# Patient Record
Sex: MALE | Race: WHITE | Employment: PART TIME | URBAN - METROPOLITAN AREA
[De-identification: names, ages, dates, MRNs, and addresses within clinical notes are randomized per-mention and may not be internally consistent; named-entity substitution may affect disease eponyms.]

---

## 2019-07-09 ENCOUNTER — APPOINTMENT (OUTPATIENT)
Dept: GENERAL RADIOLOGY | Age: 49
End: 2019-07-09
Payer: MEDICAID

## 2019-07-09 ENCOUNTER — HOSPITAL ENCOUNTER (EMERGENCY)
Age: 49
Discharge: HOME | End: 2019-07-09
Attending: EMERGENCY MEDICINE
Payer: MEDICAID

## 2019-07-09 VITALS
HEIGHT: 77 IN | TEMPERATURE: 98.1 F | BODY MASS INDEX: 37.19 KG/M2 | WEIGHT: 315 LBS | OXYGEN SATURATION: 91 % | HEART RATE: 71 BPM | SYSTOLIC BLOOD PRESSURE: 109 MMHG | DIASTOLIC BLOOD PRESSURE: 66 MMHG | RESPIRATION RATE: 16 BRPM

## 2019-07-09 DIAGNOSIS — I82.811 CHRONIC SUPERFICIAL VENOUS THROMBOSIS OF RIGHT LOWER EXTREMITY: Primary | ICD-10-CM

## 2019-07-09 PROCEDURE — 6370000000 HC RX 637 (ALT 250 FOR IP): Performed by: EMERGENCY MEDICINE

## 2019-07-09 PROCEDURE — 93971 EXTREMITY STUDY: CPT

## 2019-07-09 PROCEDURE — 73560 X-RAY EXAM OF KNEE 1 OR 2: CPT

## 2019-07-09 PROCEDURE — 99283 EMERGENCY DEPT VISIT LOW MDM: CPT

## 2019-07-09 RX ORDER — OMEPRAZOLE 20 MG/1
20 CAPSULE, DELAYED RELEASE ORAL
COMMUNITY
End: 2021-08-10 | Stop reason: SDUPTHER

## 2019-07-09 RX ORDER — NAPROXEN 500 MG/1
500 TABLET ORAL 2 TIMES DAILY PRN
Qty: 60 TABLET | Refills: 0 | Status: SHIPPED | OUTPATIENT
Start: 2019-07-09 | End: 2021-08-10

## 2019-07-09 RX ORDER — IBUPROFEN 600 MG/1
600 TABLET ORAL ONCE
Status: COMPLETED | OUTPATIENT
Start: 2019-07-09 | End: 2019-07-09

## 2019-07-09 RX ORDER — ALBUTEROL SULFATE 90 UG/1
1 AEROSOL, METERED RESPIRATORY (INHALATION)
COMMUNITY
Start: 2019-04-16 | End: 2021-08-10 | Stop reason: SDUPTHER

## 2019-07-09 RX ORDER — OXYCODONE HYDROCHLORIDE AND ACETAMINOPHEN 5; 325 MG/1; MG/1
1 TABLET ORAL EVERY 6 HOURS PRN
Qty: 16 TABLET | Refills: 0 | Status: SHIPPED | OUTPATIENT
Start: 2019-07-09 | End: 2019-07-12

## 2019-07-09 RX ORDER — AMLODIPINE BESYLATE 10 MG/1
10 TABLET ORAL
COMMUNITY
Start: 2019-04-16 | End: 2021-08-10 | Stop reason: SDUPTHER

## 2019-07-09 RX ORDER — SERTRALINE HYDROCHLORIDE 25 MG/1
TABLET, FILM COATED ORAL
Refills: 1 | COMMUNITY
Start: 2019-06-07 | End: 2021-08-10

## 2019-07-09 RX ADMIN — IBUPROFEN 600 MG: 600 TABLET, FILM COATED ORAL at 15:30

## 2019-07-09 SDOH — HEALTH STABILITY: MENTAL HEALTH: HOW OFTEN DO YOU HAVE A DRINK CONTAINING ALCOHOL?: NEVER

## 2019-07-09 ASSESSMENT — PAIN SCALES - GENERAL
PAINLEVEL_OUTOF10: 8
PAINLEVEL_OUTOF10: 8

## 2019-07-09 ASSESSMENT — PAIN DESCRIPTION - PAIN TYPE: TYPE: ACUTE PAIN

## 2019-07-09 ASSESSMENT — PAIN DESCRIPTION - ORIENTATION: ORIENTATION: RIGHT

## 2019-07-09 ASSESSMENT — PAIN DESCRIPTION - LOCATION: LOCATION: KNEE

## 2021-08-10 ENCOUNTER — OFFICE VISIT (OUTPATIENT)
Dept: FAMILY MEDICINE CLINIC | Age: 51
End: 2021-08-10
Payer: MEDICAID

## 2021-08-10 VITALS
OXYGEN SATURATION: 95 % | BODY MASS INDEX: 37.19 KG/M2 | WEIGHT: 315 LBS | HEIGHT: 77 IN | SYSTOLIC BLOOD PRESSURE: 142 MMHG | DIASTOLIC BLOOD PRESSURE: 92 MMHG | HEART RATE: 99 BPM

## 2021-08-10 DIAGNOSIS — Z86.718 HX OF BLOOD CLOTS: ICD-10-CM

## 2021-08-10 DIAGNOSIS — E66.01 CLASS 3 SEVERE OBESITY DUE TO EXCESS CALORIES WITHOUT SERIOUS COMORBIDITY WITH BODY MASS INDEX (BMI) OF 50.0 TO 59.9 IN ADULT (HCC): ICD-10-CM

## 2021-08-10 DIAGNOSIS — K21.9 GASTROESOPHAGEAL REFLUX DISEASE WITHOUT ESOPHAGITIS: ICD-10-CM

## 2021-08-10 DIAGNOSIS — J45.909 MODERATE ASTHMA, UNSPECIFIED WHETHER COMPLICATED, UNSPECIFIED WHETHER PERSISTENT: ICD-10-CM

## 2021-08-10 DIAGNOSIS — F32.A DEPRESSION, UNSPECIFIED DEPRESSION TYPE: ICD-10-CM

## 2021-08-10 DIAGNOSIS — Z00.00 ANNUAL PHYSICAL EXAM: Primary | ICD-10-CM

## 2021-08-10 DIAGNOSIS — I10 ESSENTIAL HYPERTENSION: ICD-10-CM

## 2021-08-10 PROCEDURE — 1036F TOBACCO NON-USER: CPT | Performed by: NURSE PRACTITIONER

## 2021-08-10 PROCEDURE — G8417 CALC BMI ABV UP PARAM F/U: HCPCS | Performed by: NURSE PRACTITIONER

## 2021-08-10 PROCEDURE — 99386 PREV VISIT NEW AGE 40-64: CPT | Performed by: NURSE PRACTITIONER

## 2021-08-10 PROCEDURE — G8427 DOCREV CUR MEDS BY ELIG CLIN: HCPCS | Performed by: NURSE PRACTITIONER

## 2021-08-10 PROCEDURE — 99204 OFFICE O/P NEW MOD 45 MIN: CPT | Performed by: NURSE PRACTITIONER

## 2021-08-10 RX ORDER — AMLODIPINE BESYLATE 10 MG/1
10 TABLET ORAL DAILY
Qty: 90 TABLET | Refills: 1 | Status: SHIPPED | OUTPATIENT
Start: 2021-08-10 | End: 2022-05-16 | Stop reason: SDUPTHER

## 2021-08-10 RX ORDER — OMEPRAZOLE 20 MG/1
20 CAPSULE, DELAYED RELEASE ORAL DAILY
Qty: 90 CAPSULE | Refills: 1 | Status: SHIPPED | OUTPATIENT
Start: 2021-08-10 | End: 2022-02-11

## 2021-08-10 RX ORDER — LISINOPRIL 20 MG/1
20 TABLET ORAL 2 TIMES DAILY
Qty: 90 TABLET | Refills: 1 | Status: SHIPPED | OUTPATIENT
Start: 2021-08-10 | End: 2021-08-12 | Stop reason: SDUPTHER

## 2021-08-10 RX ORDER — ALBUTEROL SULFATE 90 UG/1
1 AEROSOL, METERED RESPIRATORY (INHALATION) EVERY 4 HOURS PRN
Qty: 1 INHALER | Refills: 3 | Status: SHIPPED | OUTPATIENT
Start: 2021-08-10 | End: 2021-08-11 | Stop reason: SDUPTHER

## 2021-08-10 SDOH — ECONOMIC STABILITY: FOOD INSECURITY: WITHIN THE PAST 12 MONTHS, THE FOOD YOU BOUGHT JUST DIDN'T LAST AND YOU DIDN'T HAVE MONEY TO GET MORE.: NEVER TRUE

## 2021-08-10 SDOH — ECONOMIC STABILITY: FOOD INSECURITY: WITHIN THE PAST 12 MONTHS, YOU WORRIED THAT YOUR FOOD WOULD RUN OUT BEFORE YOU GOT MONEY TO BUY MORE.: NEVER TRUE

## 2021-08-10 ASSESSMENT — ENCOUNTER SYMPTOMS
EYES NEGATIVE: 1
SHORTNESS OF BREATH: 0
BLOOD IN STOOL: 0
CHEST TIGHTNESS: 0
CONSTIPATION: 0
ABDOMINAL PAIN: 0
DIARRHEA: 0
VOMITING: 0
WHEEZING: 0
COUGH: 0
NAUSEA: 0

## 2021-08-10 ASSESSMENT — PATIENT HEALTH QUESTIONNAIRE - PHQ9
SUM OF ALL RESPONSES TO PHQ QUESTIONS 1-9: 4
SUM OF ALL RESPONSES TO PHQ9 QUESTIONS 1 & 2: 4
SUM OF ALL RESPONSES TO PHQ QUESTIONS 1-9: 4
2. FEELING DOWN, DEPRESSED OR HOPELESS: 2
SUM OF ALL RESPONSES TO PHQ QUESTIONS 1-9: 4
1. LITTLE INTEREST OR PLEASURE IN DOING THINGS: 2

## 2021-08-10 ASSESSMENT — SOCIAL DETERMINANTS OF HEALTH (SDOH): HOW HARD IS IT FOR YOU TO PAY FOR THE VERY BASICS LIKE FOOD, HOUSING, MEDICAL CARE, AND HEATING?: NOT HARD AT ALL

## 2021-08-10 NOTE — PATIENT INSTRUCTIONS
[facility] [employer]   [address] [facility] [address]    Patient Education        Asthma in Adults: Care Instructions  Overview     Asthma makes it hard for you to breathe. During an asthma attack, the airways swell and narrow. Severe asthma attacks can be dangerous, but you can usually prevent them. Controlling asthma and treating symptoms before they get bad can help you avoid bad attacks. You may also avoid future trips to the doctor. Follow-up care is a key part of your treatment and safety. Be sure to make and go to all appointments, and call your doctor if you are having problems. It's also a good idea to know your test results and keep a list of the medicines you take. How can you care for yourself at home? · Follow your asthma action plan so you can manage your symptoms at home. An asthma action plan will help you prevent and control airway reactions and will tell you what to do during an asthma attack. If you do not have an asthma action plan, work with your doctor to build one. · Take your asthma medicine exactly as prescribed. Medicine plays an important role in controlling asthma. Talk to your doctor right away if you have any questions about what to take and how to take it. ? Use your quick-relief medicine when you have symptoms of an attack. Quick-relief medicine often is an albuterol inhaler. Some people need to use quick-relief medicine before they exercise. ? Take your controller medicine every day, not just when you have symptoms. Controller medicine is usually an inhaled corticosteroid. The goal is to prevent problems before they occur. ? If your doctor prescribed corticosteroid pills to use during an attack, take them as directed. They may take hours to work, but they may shorten the attack and help you breathe better. ? Keep your quick-relief medicine with you at all times. · Talk to your doctor before using other medicines.  Some medicines, such as aspirin, can cause asthma attacks in some people. · Check yourself for asthma symptoms to know which step to follow in your action plan. Watch for things like being short of breath, having chest tightness, coughing, and wheezing. Also notice if symptoms wake you up at night or if you get tired quickly when you exercise. · If you have a peak flow meter, use it to check how well you are breathing. This can help you predict when an asthma attack is going to occur. Then you can take medicine to prevent the asthma attack or make it less severe. · See your doctor regularly. These visits will help you learn more about asthma and what you can do to control it. Your doctor will monitor your treatment to make sure the medicine is helping you. · Keep track of your asthma attacks and your treatment. After you have had an attack, write down what triggered it, what helped end it, and any concerns you have about your asthma action plan. Take your diary when you see your doctor. You can then review your asthma action plan and decide if it is working. · Do not smoke or allow others to smoke around you. Avoid smoky places. Smoking makes asthma worse. If you need help quitting, talk to your doctor about stop-smoking programs and medicines. These can increase your chances of quitting for good. · Learn what triggers an asthma attack for you, and avoid the triggers when you can. Common triggers include colds, smoke, air pollution, dust, pollen, mold, pets, cockroaches, stress, and cold air. · Avoid colds and the flu. Talk to your doctor about getting a pneumococcal vaccine shot. If you have had one before, ask your doctor whether you need a second dose. Get a flu vaccine every fall. If you must be around people with colds or the flu, wash your hands often. When should you call for help? Call 911 anytime you think you may need emergency care. For example, call if:    · You have severe trouble breathing.    Call your doctor now or seek immediate medical care if:    · Your symptoms do not get better after you have followed your asthma action plan.     · You cough up yellow, dark brown, or bloody mucus (sputum). Watch closely for changes in your health, and be sure to contact your doctor if:    · Your coughing and wheezing get worse.     · You need to use quick-relief medicine on more than 2 days a week within a month (unless it is just for exercise).     · You need help figuring out what is triggering your asthma attacks. Where can you learn more? Go to https://chosmaneb.[employer]. org and sign in to your St[employer]wize account. Enter P597 in the [employer] box to learn more about \"Asthma in Adults: Care Instructions. \"     If you do not have an account, please click on the \"Sign Up Now\" link. Current as of: October 26, 2020               Content Version: 12.9  © 2006-2021 [employer]. Care instructions adapted under license by [facility] If you have questions about a medical condition or this instruction, always ask your healthcare professional. [place] [address] any warranty or liability for your use of this information. Patient Education        Well Visit, Ages 25 to 48: Care Instructions  Overview     Well visits can help you stay healthy. Your doctor has checked your overall health and may have suggested ways to take good care of yourself. Your doctor also may have recommended tests. At home, you can help prevent illness with healthy eating, regular exercise, and other steps. Follow-up care is a key part of your treatment and safety. Be sure to make and go to all appointments, and call your doctor if you are having problems. It's also a good idea to know your test results and keep a list of the medicines you take. How can you care for yourself at home? · Get screening tests that you and your doctor decide on.  Screening helps find diseases before any symptoms appear. · Eat healthy foods. Choose fruits, vegetables, whole grains, protein, and low-fat dairy foods. Limit fat, especially saturated fat. Reduce salt in your diet. · Limit alcohol. If you are a man, have no more than 2 drinks a day or 14 drinks a week. If you are a woman, have no more than 1 drink a day or 7 drinks a week. · Get at least 30 minutes of physical activity on most days of the week. Walking is a good choice. You also may want to do other activities, such as running, swimming, cycling, or playing tennis or team sports. Discuss any changes in your exercise program with your doctor. · Reach and stay at a healthy weight. This will lower your risk for many problems, such as obesity, diabetes, heart disease, and high blood pressure. · Do not smoke or allow others to smoke around you. If you need help quitting, talk to your doctor about stop-smoking programs and medicines. These can increase your chances of quitting for good. · Care for your mental health. It is easy to get weighed down by worry and stress. Learn strategies to manage stress, like deep breathing and mindfulness, and stay connected with your family and community. If you find you often feel sad or hopeless, talk with your doctor. Treatment can help. · Talk to your doctor about whether you have any risk factors for sexually transmitted infections (STIs). You can help prevent STIs if you wait to have sex with a new partner (or partners) until you've each been tested for STIs. It also helps if you use condoms (male or female condoms) and if you limit your sex partners to one person who only has sex with you. Vaccines are available for some STIs, such as HPV. · Use birth control if it's important to you to prevent pregnancy. Talk with your doctor about the choices available and what might be best for you. · If you think you may have a problem with alcohol or drug use, talk to your doctor.  This includes prescription medicines (such certain foods. You may want to work with a dietitian or a certified diabetes educator. He or she can give you tips and meal ideas and can answer your questions about meal planning. This health professional can also help you reach a healthy weight if that is one of your goals. What should you know about eating carbs? Managing the amount of carbohydrate (carbs) you eat is an important part of healthy meals when you have diabetes. Carbohydrate is found in many foods. · Learn which foods have carbs. And learn the amounts of carbs in different foods. ? Bread, cereal, pasta, and rice have about 15 grams of carbs in a serving. A serving is 1 slice of bread (1 ounce), ½ cup of cooked cereal, or 1/3 cup of cooked pasta or rice. ? Fruits have 15 grams of carbs in a serving. A serving is 1 small fresh fruit, such as an apple or orange; ½ of a banana; ½ cup of cooked or canned fruit; ½ cup of fruit juice; 1 cup of melon or raspberries; or 2 tablespoons of dried fruit. ? Milk and no-sugar-added yogurt have 15 grams of carbs in a serving. A serving is 1 cup of milk or 2/3 cup of no-sugar-added yogurt. ? Starchy vegetables have 15 grams of carbs in a serving. A serving is ½ cup of mashed potatoes or sweet potato; 1 cup winter squash; ½ of a small baked potato; ½ cup of cooked beans; or ½ cup cooked corn or green peas. · Learn how much carbs to eat each day and at each meal. A dietitian or CDE can teach you how to keep track of the amount of carbs you eat. This is called carbohydrate counting. · If you are not sure how to count carbohydrate grams, use the Plate Method to plan meals. It is a good, quick way to make sure that you have a balanced meal. It also helps you spread carbs throughout the day. ? Divide your plate by types of foods. Put non-starchy vegetables on half the plate, meat or other protein food on one-quarter of the plate, and a grain or starchy vegetable in the final quarter of the plate.  To this you can add a small piece of fruit and 1 cup of milk or yogurt, depending on how many carbs you are supposed to eat at a meal.  · Try to eat about the same amount of carbs at each meal. Do not \"save up\" your daily allowance of carbs to eat at one meal.  · Proteins have very little or no carbs per serving. Examples of proteins are beef, chicken, turkey, fish, eggs, tofu, cheese, cottage cheese, and peanut butter. A serving size of meat is 3 ounces, which is about the size of a deck of cards. Examples of meat substitute serving sizes (equal to 1 ounce of meat) are 1/4 cup of cottage cheese, 1 egg, 1 tablespoon of peanut butter, and ½ cup of tofu. How can you eat out and still eat healthy? · Learn to estimate the serving sizes of foods that have carbohydrate. If you measure food at home, it will be easier to estimate the amount in a serving of restaurant food. · If the meal you order has too much carbohydrate (such as potatoes, corn, or baked beans), ask to have a low-carbohydrate food instead. Ask for a salad or green vegetables. · If you use insulin, check your blood sugar before and after eating out to help you plan how much to eat in the future. · If you eat more carbohydrate at a meal than you had planned, take a walk or do other exercise. This will help lower your blood sugar. What are some tips for eating healthy? · Limit saturated fat, such as the fat from meat and dairy products. This is a healthy choice because people who have diabetes are at higher risk of heart disease. So choose lean cuts of meat and nonfat or low-fat dairy products. Use olive or canola oil instead of butter or shortening when cooking. · Don't skip meals. Your blood sugar may drop too low if you skip meals and take insulin or certain medicines for diabetes. · Check with your doctor before you drink alcohol. Alcohol can cause your blood sugar to drop too low.  Alcohol can also cause a bad reaction if you take certain diabetes medicines. Follow-up care is a key part of your treatment and safety. Be sure to make and go to all appointments, and call your doctor if you are having problems. It's also a good idea to know your test results and keep a list of the medicines you take. Where can you learn more? Go to https://chpepiceweb.[employer]. org and sign in to your Hapzing account. Enter O973 in the [employer] box to learn more about \"Learning About Carbohydrate (Carb) Counting and Eating Out When You Have Diabetes. \"     If you do not have an account, please click on the \"Sign Up Now\" link. Current as of: August 31, 2020               Content Version: 12.9  © 2006-2021 Healthwise, [employer]. Care instructions adapted under license by [facility] ([facility]). If you have questions about a medical condition or this instruction, always ask your healthcare professional. [place] [address] any warranty or liability for your use of this information. Patient Education        Learning About Meal Planning for Diabetes  Why plan your meals? Meal planning can be a key part of managing diabetes. Planning meals and snacks with the right balance of carbohydrate, protein, and fat can help you keep your blood sugar at the target level you set with your doctor. You don't have to eat special foods. You can eat what your family eats, including sweets once in a while. But you do have to pay attention to how often you eat and how much you eat of certain foods. You may want to work with a dietitian or a certified diabetes educator. He or she can give you tips and meal ideas and can answer your questions about meal planning. This health professional can also help you reach a healthy weight if that is one of your goals. What plan is right for you? Your dietitian or diabetes educator may suggest that you start with the plate format or carbohydrate counting.   The plate format  The plate format is a simple way to help you manage how you eat. You plan meals by learning how much space each food should take on a plate. Using the plate format helps you spread carbohydrate throughout the day. It can make it easier to keep your blood sugar level within your target range. It also helps you see if you're eating healthy portion sizes. To use the plate format, you put non-starchy vegetables on half your plate. Add meat or meat substitutes on one-quarter of the plate. Put a grain or starchy vegetable (such as brown rice or a potato) on the final quarter of the plate. You can add a small piece of fruit and some low-fat or fat-free milk or yogurt, depending on your carbohydrate goal for each meal.  Here are some tips for using the plate format:  · Make sure that you are not using an oversized plate. A 9-inch plate is best. Many restaurants use larger plates. · Get used to using the plate format at home. Then you can use it when you eat out. · Write down your questions about using the plate format. Talk to your doctor, a dietitian, or a diabetes educator about your concerns. Carbohydrate counting  With carbohydrate counting, you plan meals based on the amount of carbohydrate in each food. Carbohydrate raises blood sugar higher and more quickly than any other nutrient. It is found in desserts, breads and cereals, and fruit. It's also found in starchy vegetables such as potatoes and corn, grains such as rice and pasta, and milk and yogurt. Spreading carbohydrate throughout the day helps keep your blood sugar levels within your target range. Your daily amount depends on several things, including your weight, how active you are, which diabetes medicines you take, and what your goals are for your blood sugar levels. A registered dietitian or diabetes educator can help you plan how much carbohydrate to include in each meal and snack.   A guideline for your daily amount of carbohydrate is:  · 45 to 60 grams at each meal. That's about the same as 3 to 4 carbohydrate servings. · 15 to 20 grams at each snack. That's about the same as 1 carbohydrate serving. The Nutrition Facts label on packaged foods tells you how much carbohydrate is in a serving of the food. First, look at the serving size on the food label. Is that the amount you eat in a serving? All of the nutrition information on a food label is based on that serving size. So if you eat more or less than that, you'll need to adjust the other numbers. Total carbohydrate is the next thing you need to look for on the label. If you count carbohydrate servings, one serving of carbohydrate is 15 grams. For foods that don't come with labels, such as fresh fruits and vegetables, you'll need a guide that lists carbohydrate in these foods. Ask your doctor, dietitian, or diabetes educator about books or other nutrition guides you can use. If you take insulin, you need to know how many grams of carbohydrate are in a meal. This lets you know how much rapid-acting insulin to take before you eat. If you use an insulin pump, you get a constant rate of insulin during the day. So the pump must be programmed at meals to give you extra insulin to cover the rise in blood sugar after meals. When you know how much carbohydrate you will eat, you can take the right amount of insulin. Or, if you always use the same amount of insulin, you need to make sure that you eat the same amount of carbohydrate at meals. If you need more help to understand carbohydrate counting and food labels, ask your doctor, dietitian, or diabetes educator. How can you plan healthy meals? Here are some tips to get started:  · Plan your meals a week at a time. Don't forget to include snacks too. · Use cookbooks or online recipes to plan several main meals. Plan some quick meals for busy nights. You also can double some recipes that freeze well. Then you can save half for other busy nights when you don't have time to cook.   · Make sure you have the target range. Counting carbs is one of the best ways to keep your blood sugar under control. If you use insulin, counting carbs helps you match the right amount of insulin to the number of grams of carbs in a meal. Then you can change your diet and insulin dose as needed. Testing your blood sugar several times a day can help you learn how carbs affect your blood sugar. A registered dietitian or certified diabetes educator can help you plan meals and snacks. Follow-up care is a key part of your treatment and safety. Be sure to make and go to all appointments, and call your doctor if you are having problems. It's also a good idea to know your test results and keep a list of the medicines you take. How can you care for yourself at home? Know your daily amount of carbohydrates  Your daily amount depends on several things, such as your weight, how active you are, which diabetes medicines you take, and what your goals are for your blood sugar levels. A registered dietitian or certified diabetes educator can help you plan how many carbs to include in each meal and snack. For most adults, a guideline for the daily amount of carbs is:  · 45 to 60 grams at each meal. That's about the same as 3 to 4 carbohydrate servings. · 15 to 20 grams at each snack. That's about the same as 1 carbohydrate serving. Count carbs  Counting carbs lets you know how much rapid-acting insulin to take before you eat. If you use an insulin pump, you get a constant rate of insulin during the day. So the pump must be programmed at meals. This gives you extra insulin to cover the rise in blood sugar after meals. If you take insulin:  · Learn your own insulin-to-carb ratio. You and your diabetes health professional will figure out the ratio. You can do this by testing your blood sugar after meals. For example, you may need a certain amount of insulin for every 15 grams of carbs.   · Add up the carb grams in a meal. Then you can figure out how many units of insulin to take based on your insulin-to-carb ratio. · Exercise lowers blood sugar. You can use less insulin than you would if you were not doing exercise. Keep in mind that timing matters. If you exercise within 1 hour after a meal, your body may need less insulin for that meal than it would if you exercised 3 hours after the meal. Test your blood sugar to find out how exercise affects your need for insulin. If you do or don't take insulin:  · Look at labels on packaged foods. This can tell you how many carbs are in a serving. You can also use guides from the American Diabetes Association. · Be aware of portions, or serving sizes. If a package has two servings and you eat the whole package, you need to double the number of grams of carbohydrate listed for one serving. · Protein, fat, and fiber do not raise blood sugar as much as carbs do. If you eat a lot of these nutrients in a meal, your blood sugar will rise more slowly than it would otherwise. Eat from all food groups  · Eat at least three meals a day. · Plan meals to include food from all the food groups. The food groups include grains, fruits, dairy, proteins, and vegetables. · Talk to your dietitian or diabetes educator about ways to add limited amounts of sweets into your meal plan. · If you drink alcohol, talk to your doctor. It may not be recommended when you are taking certain diabetes medicines. Where can you learn more? Go to https://Zenfoliopebevewarelis.[employer]. org and sign in to your [employer] account. Enter Z848 in the [facility] box to learn more about \"Counting Carbohydrates for Diabetes: Care Instructions. \"     If you do not have an account, please click on the \"Sign Up Now\" link. Current as of: August 31, 2020               Content Version: 12.9  © 3949-5605 Healthwise, Incorporated. Care instructions adapted under license by [facility] ([facility]).  If you have questions about a medical condition or this instruction, always ask your healthcare professional. [place] [address] any warranty or liability for your use of this information. Patient Education        COVID-19 (coronavirus 2019) vaccine, Moderna  Pronunciation:  RADHA donahue (radha damon) VACALLIE northn  Brand: Moderna COVID-19 Vaccine PF  What is the most important information I should know about this vaccine? The FDA has authorized emergency use of this vaccine as it may help prevent infection with COVID-19. This vaccine has not been approved to prevent or treat coronavirus or COVID-19. Becoming infected with COVID-19 is much more dangerous to your health than receiving this vaccine. If you have a certain type of reaction after the first dose of this vaccine, your healthcare provider will determine if you can safely receive the second dose. What is the COVID-19 vaccine? COVID-19 is a serious disease caused by a coronavirus called SARS-CoV-2 (Serious Acute Respiratory Syndrome Coronavirus 2). COVID-19 is spread from person to person through the air. COVID-19 can affect your lungs or other organs. Symptoms may be mild or serious and include fever, chills, cough, sore throat, shortness of breath, tiredness, body aches, headache, loss of taste or smell, runny or stuffy nose, nausea, vomiting, or diarrhea. The [address] and Drug Administration (FDA) has authorized emergency use of COVID-19 vaccine to help prevent infection with SARS-CoV-2. The Moderna vaccine is for use only in adults. This vaccine may help your body develop immunity to SARS-CoV-2. However, this vaccine has not been approved to prevent or treat coronavirus or COVID-19. COVID-19 vaccine is experimental and all of its risks are not yet known. The COVID-19 vaccine does not contain coronavirus and cannot give you COVID-19. Like any vaccine, COVID-19 vaccine may not provide protection in every person.   What should I discuss with my healthcare provider before receiving this vaccine? You should not receive this vaccine if you've ever had an allergic reaction to a COVID-19 vaccine. Your healthcare provider will determine whether any reaction you have would prevent you from safely getting the second dose. If you are infected with COVID-19, are waiting for testing results, or are exposed to someone infected with COVID-19: You may not be able to receive this vaccine until you have no symptoms and/or your required quarantine period has ended. Receiving this vaccine will not make you less contagious to other people if you are infected with COVID-19 but you have no symptoms. If you had COVID-19 and were treated with monoclonal antibodies or convalescent plasma: You should wait 90 days before getting a COVID-19 vaccine. Ask your doctor if you are unsure about any COVID-19 treatments you received. Tell the person giving you this vaccine if you have a fever, or if:  · you've received any treatment or medication for COVID-19 infection in the past 90 days;  · you've received or will receive any other vaccine within 14 days before or after your COVID-19 vaccine;  · you have a weak immune system caused by disease or by using certain medicine (this vaccine may not be as effective if you are immunosuppressed);  · you have bleeding problems;  · you use a blood thinner (such as warfarin, Coumadin, or Jose Lipa); or  · you are pregnant or breastfeeding. COVID-19 is more likely to cause serious illness or death in a pregnant woman. Not all risks are known yet, but this vaccine is likely to be less harmful than becoming infected with COVID-19 during pregnancy. How is this vaccine given? Read all vaccine information sheets provided to you. COVID-19 vaccine is given as an injection (shot) into a muscle. The Moderna vaccine is given in a series of 2 shots given 1 month apart. Your first and second shot should both be the Moderna type of COVID-19 vaccine.   The Moderna COVID-19 vaccine is transported and stored at very cold temperatures. However, this vaccine will be at room temperature when it is injected into your arm. You will receive a reminder card showing the date and type of your first injection. Take this card with you when you get your second shot. You will be \"fully vaccinated\" if it has been at least 2 weeks since you received your second dose of this vaccine. You may become infected with COVID-19 if the vaccine has not had enough time to provide protection. Even after you are fully vaccinated, keep using infection control methods when you are in public or around others who may not have been vaccinated. This includes social distancing, hand-washing, using protective face covering, disinfecting surfaces you touch a lot, and not sharing personal items with others. Receiving a COVID-19 vaccine will not cause you to test positive on a coronavirus test. However, once your body develops immunity to COVID-19, you could test positive on an antibody test (a test to detect immunity in your body from previous exposure to coronavirus). It is not known how long this vaccine will protect you from infection with COVID-19. It also is not known how long immunity will last in a person who's been infected with and recovered from COVID-19. COVID-19 vaccine is still being studied and all of its risks are not yet known. Updated federal public health recommendations may be found at [employer]ander[employer].br  What happens if I miss a dose? Be sure to receive all recommended doses of COVID-19 vaccine or you may not be fully protected. Contact your vaccination provider or health department if you miss your second dose. What happens if I overdose? An overdose of this vaccine is unlikely to occur. What should I avoid after receiving this vaccine? Avoid receiving other vaccines without first seeking medical advice.   What are the possible side effects of this vaccine? Get emergency medical help if you have signs of an allergic reaction: hives, itching; confusion, dizziness, fainting; vomiting, diarrhea; fast heartbeats, wheezing, difficult breathing; swelling of your face, lips, tongue, or throat. An allergic reaction is more likely to occur within 30 minutes after you receive the vaccine. You will be treated quickly if you have a reaction. You should not receive the second vaccine if the first shot caused an allergic reaction. Your healthcare provider will determine if you can safely receive the second dose. Becoming infected with COVID-19 is much more dangerous to your health than receiving this vaccine. Serious side effects other than an allergic reaction may include:  · pale or clammy skin, sweating, feeling warm or cold;  · feeling anxious, nauseated, weak, or light-headed;  · slow heartbeats, rapid breathing; or  · changes in vision or hearing. Fever may be a normal symptom as your body begins to develop immunity to COVID-19. However, you should call your doctor right away if you have any side effects that concern you. Common side effects may include:  · fever, chills, swollen glands;  · swelling or tenderness under your arm;  · pain, redness, or swelling where the shot was given;  · nausea, vomiting;  · feeling tired; or  · headache, muscle pain, joint pain. You may be able to treat these effects with an over-the-counter pain reliever such as acetaminophen (Tylenol) or ibuprofen (Motrin, Advil, and others). Follow the label directions or your vaccination provider's instructions. Other side effects, mild or serious, may occur with more widespread use of COVID-19 vaccine. This is not a complete list of side effects and others may occur. Call your doctor for medical advice about side effects. You may report vaccine side effects to the [address] [address] and Human Services at [phone].   You may also use a smartphone-based program called V-safe to communicate with the Centers for Disease Control and Prevention (CDC) about any health problems you have after receiving a COVID-19 vaccine: www.cdc.gov/vsafe. What other drugs will affect this vaccine? Before receiving this vaccine, tell your vaccination provider about all other vaccines you have received in the past 14 days. Also tell the provider about all your current medicines. This includes prescription and over-the-counter medicines, vitamins, and herbal products. Not all possible interactions are listed here. Where can I get more information? Your vaccination provider, pharmacist, or doctor can provide more information about this vaccine. Additional information is available from your local health department or the Centers for Disease Control and Prevention. Remember, keep this and all other medicines out of the reach of children, never share your medicines with others, and use this medication only for the indication prescribed. Every effort has been made to ensure that the information provided by [address] is accurate, up-to-date, and complete, but no guarantee is made to that effect. Drug information contained herein may be time sensitive. [employer] information has been compiled for use by healthcare practitioners and consumers in the [place] and therefore [employer] does not warrant that uses outside of the [place] are appropriate, unless specifically indicated otherwise. [facility]'s drug information does not endorse drugs, diagnose patients or recommend therapy. [facility] drug information is an informational resource designed to assist licensed healthcare practitioners in caring for their patients and/or to serve consumers viewing this service as a supplement to, and not a substitute for, the expertise, skill, knowledge and judgment of healthcare practitioners.  The absence of a warning for a given drug or drug combination in no way should be construed to indicate that the drug or drug combination is safe, effective or appropriate for any given patient. [facility] does not assume any responsibility for any aspect of healthcare administered with the aid of information [facility] provides. The information contained herein is not intended to cover all possible uses, directions, precautions, warnings, drug interactions, allergic reactions, or adverse effects. If you have questions about the drugs you are taking, check with your doctor, nurse or pharmacist.  Copyright 0353-9696 [address]: 2.01. Revision date: 3/8/2021. Care instructions adapted under license by [facility] ([facility]). If you have questions about a medical condition or this instruction, always ask your healthcare professional. [place] [address] any warranty or liability for your use of this information. Patient Education        COVID-19 (coronavirus 2019) vaccine, Pfizer  Pronunciation:  RADHA vid-19 radha STRONG na vye nelson VAX een  Brand:  Pfizer-BioNTech COVID-19 Vaccine PF  What is the most important information I should know about this vaccine? The FDA has authorized emergency use of this vaccine as it may help prevent infection with COVID-19. This vaccine has not been approved to prevent or treat coronavirus or COVID-19. Becoming infected with COVID-19 is much more dangerous to your health than receiving this vaccine. If you have a certain type of reaction after the first dose of this vaccine, your healthcare provider will determine if you can safely receive the second dose. What is the COVID-19 vaccine? COVID-19 is a serious disease caused by a coronavirus called SARS-CoV-2 (Serious Acute Respiratory Syndrome Coronavirus 2). COVID-19 is spread from person to person through the air. COVID-19 can affect your lungs or other organs.  Symptoms may be mild or serious and include fever, chills, cough, sore throat, shortness of breath, tiredness, body aches, headache, loss of taste or smell, runny or stuffy nose, nausea, vomiting, or diarrhea. The [address] and Drug Administration (FDA) has authorized emergency use of COVID-19 vaccine to help prevent infection with SARS-CoV-2. The Pfizer vaccine is for use in people who are at least 12years old. This vaccine may help your body develop immunity to SARS-CoV-2. However, this vaccine has not been approved to prevent or treat coronavirus or COVID-19. COVID-19 vaccine is experimental and all of its risks are not yet known. The COVID-19 vaccine does not contain coronavirus and cannot give you COVID-19. Like any vaccine, COVID-19 vaccine may not provide protection in every person. What should I discuss with my healthcare provider before receiving this vaccine? You should not receive this vaccine if you've ever had an allergic reaction to a COVID-19 vaccine. Your healthcare provider will determine whether any reaction you have would prevent you from safely getting the second dose. If you are infected with COVID-19, are waiting for testing results, or are exposed to someone infected with COVID-19: You may not be able to receive this vaccine until you have no symptoms and/or your required quarantine period has ended. Receiving this vaccine will not make you less contagious to other people if you are infected with COVID-19 but you have no symptoms. If you had COVID-19 and were treated with monoclonal antibodies or convalescent plasma: You should wait 90 days before getting a COVID-19 vaccine. Ask your doctor if you are unsure about any COVID-19 treatments you received.   Tell the person giving you this vaccine if you have a fever, or if:  · you've received any treatment or medication for COVID-19 infection in the past 90 days;  · you've received or will receive any other vaccine within 14 days before or after your COVID-19 vaccine;  · you have a weak immune system caused by disease or by using certain medicine (this vaccine may not be as effective if you are immunosuppressed);  · you have bleeding problems;  · you use a blood thinner (such as warfarin, Coumadin, or Jantoven); or  · you are pregnant or breastfeeding. COVID-19 is more likely to cause serious illness or death in a pregnant woman. Not all risks are known yet, but this vaccine is likely to be less harmful than becoming infected with COVID-19 during pregnancy. How is this vaccine given? Read all vaccine information sheets provided to you. COVID-19 vaccine is given as an injection (shot) into a muscle. The Pfizer vaccine is given in a series of 2 shots given 3 weeks apart. Your first and second shot should both be the [employer] type of COVID-19 vaccine. The Pfizer COVID-19 vaccine is transported and stored at ultra-cold temperatures. However, this vaccine will be at room temperature when it is injected into your arm. You will receive a reminder card showing the date and type of your first injection. Take this card with you when you get your second shot. You will be \"fully vaccinated\" if it has been at least 2 weeks since you received your second dose of this vaccine. You may become infected with COVID-19 if the vaccine has not had enough time to provide protection. Receiving this vaccine will not make you less contagious to other people if you are already infected with COVID-19 but you have no symptoms. Keep using infection control methods such as self-isolation, social distancing, hand-washing, using protective face covering, disinfecting surfaces you touch a lot, and not sharing personal items with others. Receiving a COVID-19 vaccine will not cause you to test positive on a coronavirus test. However, once your body develops immunity to COVID-19, you could test positive on an antibody test (a test to detect immunity in your body from previous exposure to coronavirus). It is not known how long this vaccine will protect you from infection with COVID-19.   It also is not known how long immunity will last in a person who's been infected with and recovered from COVID-19. COVID-19 vaccine is still being studied and all of its risks are not yet known. Updated federal public health recommendations may be found at [employer].br  What happens if I miss a dose? Be sure to receive all recommended doses of COVID-19 vaccine or you may not be fully protected. Contact your vaccination provider or health department if you miss your second dose. What happens if I overdose? An overdose of this vaccine is unlikely to occur. What should I avoid after receiving this vaccine? Avoid receiving other vaccines without first seeking medical advice. What are the possible side effects of this vaccine? Get emergency medical help if you have signs of an allergic reaction: hives, itching; confusion, dizziness, fainting; vomiting, diarrhea; fast heartbeats, wheezing, difficult breathing; swelling of your face, lips, tongue, or throat. An allergic reaction is more likely to occur within 30 minutes after you receive the vaccine. You will be treated quickly if you have a reaction. You should not receive the second vaccine if the first shot caused an allergic reaction. Your healthcare provider will determine if you can safely receive the second dose. Becoming infected with COVID-19 is much more dangerous to your health than receiving this vaccine. Serious side effects other than an allergic reaction may include:  · pale or clammy skin, sweating, feeling warm or cold;  · feeling anxious, nauseated, weak, or light-headed;  · slow heartbeats, rapid breathing; or  · changes in vision or hearing. Fever may be a normal symptom as your body begins to develop immunity to COVID-19. However, you should call your doctor right away if you have any side effects that concern you.   Common side effects may include:  · fever, chills, swollen glands;  · pain, redness, or swelling where the shot was given;  · nausea, not feeling well;  · feeling tired; or  · headache, muscle pain, joint pain. You may be able to treat these effects with an over-the-counter pain reliever such as acetaminophen (Tylenol) or ibuprofen (Motrin, Advil, and others). Follow the label directions or your vaccination provider's instructions. Other side effects, mild or serious, may occur with more widespread use of COVID-19 vaccine. This is not a complete list of side effects and others may occur. Call your doctor for medical advice about side effects. You may report vaccine side effects to the [address] [place] [address] [address] and Human Services at 0-486.719.1985. You may also use a smartphone-based program called V-safe to communicate with the Centers for Disease Control and Prevention (CDC) about any health problems you have after receiving a COVID-19 vaccine: www.cdc.gov/vsafe. What other drugs will affect this vaccine? Before receiving this vaccine, tell your vaccination provider about all other vaccines you have received in the past 14 days. Also tell the provider about all your current medicines. This includes prescription and over-the-counter medicines, vitamins, and herbal products. Not all possible interactions are listed here. Where can I get more information? Your vaccination provider, pharmacist, or doctor can provide more information about this vaccine. Additional information is available from your local health department or the Centers for Disease Control and Prevention. Remember, keep this and all other medicines out of the reach of children, never share your medicines with others, and use this medication only for the indication prescribed. Every effort has been made to ensure that the information provided by [address]ith[address] is accurate, up-to-date, and complete, but no guarantee is made to that effect. Drug information contained herein may be time sensitive.  [employer] information has been compiled for use by healthcare practitioners and consumers in the [place] and therefore [employer] does not warrant that uses outside of the [place] are appropriate, unless specifically indicated otherwise. [facility]'s drug information does not endorse drugs, diagnose patients or recommend therapy. [facility] drug information is an informational resource designed to assist licensed healthcare practitioners in caring for their patients and/or to serve consumers viewing this service as a supplement to, and not a substitute for, the expertise, skill, knowledge and judgment of healthcare practitioners. The absence of a warning for a given drug or drug combination in no way should be construed to indicate that the drug or drug combination is safe, effective or appropriate for any given patient. [facility] does not assume any responsibility for any aspect of healthcare administered with the aid of information [facility] provides. The information contained herein is not intended to cover all possible uses, directions, precautions, warnings, drug interactions, allergic reactions, or adverse effects. If you have questions about the drugs you are taking, check with your doctor, nurse or pharmacist.  Copyright 8477-3255 [address]: 2.01. Revision date: 3/8/2021. Care instructions adapted under license by [facility] ([facility]). If you have questions about a medical condition or this instruction, always ask your healthcare professional. [place] [address] any warranty or liability for your use of this information.

## 2021-08-10 NOTE — PROGRESS NOTES
8/10/2021    Finesse Hatch (:  1970) is a 54 y.o. male, here for evaluation of the following medical concerns:    Chief Complaint   Patient presents with   [address] Swain Patient     Past medical, surgical, family and social history, as well as current medications and allergies, were reviewed and updated with the patient. Patient is here for annual physical.    Dental: 3-4 years ago  Eye: up-to-date  Colonoscopy: ordered  Exercise: not exercising currently  Diet: regular diet, usually is eating out most of the time. 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 Social:  Single, no children    Obesity:  States he has struggled with is weight his entire life. He has been up and down on the scale. He refused to weigh in today. He states when the pandemic started he stopped going to the gym and started ordering takeout often, at least 3-5 times per week. He is not exercising. Depression:  States he has taken Zoloft in the past and this worked, however, he does not currently want to take medications. He denies suicidal/homicidal ideations. He does not sleep well and thinks this contributes to his depression. He does not snore. He naps often. States he procrastinates most of the time and believe this is related to his depression. Asthma:  Current treatment includes beta agonist inhalers, inhaled steroids. Using preventive medication(s) consistently: yes. Residual symptoms: none. Patient denies any other symptoms. He requires his rescue inhaler 2 time(s) per week. GERD:  Stable, taking omeprazole, has tried without and done well when eating well but has heartburn when eating a lot of take out. Hypertension:  Elevated today, still taking Amlodipine, does not follow a low sodium diet. Does not exercise. Discussed covid vaccine, will get from [facility].     Review of Systems   Constitutional: Negative for chills, diaphoresis, fatigue and fever. HENT: Negative. Eyes: Negative. Respiratory: Negative for cough, chest tightness, shortness of breath and wheezing. Cardiovascular: Negative for chest pain, palpitations and leg swelling. Gastrointestinal: Negative for abdominal pain, blood in stool, constipation, diarrhea, nausea and vomiting. Endocrine: Negative. Genitourinary: Negative. Musculoskeletal: Negative. Skin: Negative. Neurological: Negative for dizziness, weakness and headaches. Psychiatric/Behavioral: Negative. Prior to Visit Medications    Medication Sig Taking? Authorizing Provider   omeprazole (PRILOSEC) 20 MG delayed release capsule Take 1 capsule by mouth Daily Yes DYLON Joshua CNP   albuterol sulfate HFA (VENTOLIN HFA) 108 (90 Base) MCG/ACT inhaler Inhale 1 puff into the lungs every 4 hours as needed for Wheezing Yes DYLON Joshua CNP   amLODIPine (NORVASC) 10 MG tablet Take 1 tablet by mouth daily Yes DYLON Joshua CNP   mometasone-formoterol (Dick Figures) 200-5 MCG/ACT inhaler Inhale 2 puffs into the lungs 2 times daily Yes DYLON Joshua CNP   lisinopril (PRINIVIL;ZESTRIL) 20 MG tablet Take 1 tablet by mouth 2 times daily Yes DYLON Joshua CNP        No Known Allergies    Past Medical History:   Diagnosis Date    Asthma     Depression     Hx of blood clots        Past Surgical History:   Procedure Laterality Date    INCOMPLETE CIRCUMCISION REPAIR         Social History     Tobacco Use    Smoking status: Former Smoker     Quit date: 2002     Years since quittin.1    Smokeless tobacco: Former User     Quit date: 1995   Vaping Use    Vaping Use: Never used   Substance Use Topics    Alcohol use: Never    Drug use: Never        No family history on file.     Vitals:    08/10/21 1328 08/10/21 1542   BP: (!) 140/90 (!) 142/92   Site:  Right Upper Arm   Position:  Sitting   Cuff Size:  Medium Adult   Pulse: 99    SpO2: 95%    Weight: (!) 500 lb (226.8 kg)    Height: 6' 5\" (1.956 m)      Estimated body mass index is 59.29 kg/m² as calculated from the following:    Height as of this encounter: 6' 5\" (1.956 m). Weight as of this encounter: 500 lb (226.8 kg). Physical Exam  Vitals and nursing note reviewed. Constitutional:       General: He is awake. He is not in acute distress. Appearance: Normal appearance. He is well-developed. He is morbidly obese. HENT:      Head: Normocephalic and atraumatic. Right Ear: Tympanic membrane, ear canal and external ear normal.      Left Ear: Tympanic membrane, ear canal and external ear normal.      Nose: Nose normal.      Mouth/Throat:      Mouth: Mucous membranes are moist.      Pharynx: Oropharynx is clear. Eyes:      Extraocular Movements: Extraocular movements intact. Conjunctiva/sclera: Conjunctivae normal.      Pupils: Pupils are equal, round, and reactive to light. Neck:      Thyroid: No thyroid mass, thyromegaly or thyroid tenderness. Vascular: No carotid bruit or JVD. Cardiovascular:      Rate and Rhythm: Normal rate and regular rhythm. Heart sounds: Normal heart sounds, S1 normal and S2 normal. No murmur heard. Pulmonary:      Effort: Pulmonary effort is normal.      Breath sounds: Normal breath sounds and air entry. Abdominal:      General: Abdomen is flat. Bowel sounds are normal.      Palpations: Abdomen is soft. Musculoskeletal:         General: Normal range of motion. Cervical back: Normal range of motion and neck supple. Right lower le+ Pitting Edema present. Left lower le+ Pitting Edema present. Lymphadenopathy:      Head:      Right side of head: No submental, submandibular, tonsillar, preauricular or posterior auricular adenopathy. Left side of head: No submental, submandibular, tonsillar, preauricular or posterior auricular adenopathy. Cervical: No cervical adenopathy.       Upper Body:      Right upper body: No supraclavicular adenopathy. Left upper body: No supraclavicular adenopathy. Skin:     General: Skin is warm and dry. Capillary Refill: Capillary refill takes less than 2 seconds. Neurological:      General: No focal deficit present. Mental Status: He is alert and oriented to person, place, and time. Psychiatric:         Mood and Affect: Mood normal.         Behavior: Behavior normal. Behavior is cooperative. Thought Content: Thought content normal.         Judgment: Judgment normal.         ASSESSMENT/PLAN:  1. Annual physical exam    2. Class 3 severe obesity due to excess calories without serious comorbidity with body mass index (BMI) of 50.0 to 59.9 in adult [facility])  Recommended at least 30 minutes of aerobic exercise daily. Strongly recommend eliminating concentrated sweets, reducing simple carbs. Avoid corn syrup and hydrogenated oils. Focus on fruits, vegs, whole grains, lean meats and push water. Fish oil, high fiber foods recommended. - Comprehensive Metabolic Panel; Future  - CBC Auto Differential; Future  - Lipid, Fasting; Future  - TSH with Reflex; Future  - T4, Free; Future  - Hemoglobin A1C; Future  - HIV Screen; Future  - HEPATITIS C ANTIBODY; Future  - Cologuard (For External Results Only); Future  - [facility]  - Cologuard (For External Results Only)    3. Moderate asthma, unspecified whether complicated, unspecified whether persistent  stable  - albuterol sulfate HFA (VENTOLIN HFA) 108 (90 Base) MCG/ACT inhaler; Inhale 1 puff into the lungs every 4 hours as needed for Wheezing  Dispense: 1 Inhaler; Refill: 3  - mometasone-formoterol (DULERA) 200-5 MCG/ACT inhaler; Inhale 2 puffs into the lungs 2 times daily  Dispense: 1 Inhaler; Refill: 3    4. Depression, unspecified depression type  stable    5. Hx of blood clots  stable    6.  Gastroesophageal reflux disease without esophagitis  stable  - omeprazole (PRILOSEC) 20 MG delayed release capsule; Take 1 capsule by mouth Daily  Dispense: 90 capsule; Refill: 1    7. Essential hypertension  Elevated today, added lisinopril  - amLODIPine (NORVASC) 10 MG tablet; Take 1 tablet by mouth daily  Dispense: 90 tablet; Refill: 1  - lisinopril (PRINIVIL;ZESTRIL) 20 MG tablet; Take 1 tablet by mouth 2 times daily  Dispense: 90 tablet; Refill: 1    Return in about 6 months (around 2/10/2022), or if symptoms worsen or fail to improve.

## 2021-08-11 ENCOUNTER — TELEPHONE (OUTPATIENT)
Dept: FAMILY MEDICINE CLINIC | Age: 51
End: 2021-08-11

## 2021-08-11 DIAGNOSIS — J45.909 MODERATE ASTHMA, UNSPECIFIED WHETHER COMPLICATED, UNSPECIFIED WHETHER PERSISTENT: ICD-10-CM

## 2021-08-11 RX ORDER — ALBUTEROL SULFATE 90 UG/1
1 AEROSOL, METERED RESPIRATORY (INHALATION) EVERY 4 HOURS PRN
Qty: 1 INHALER | Refills: 3 | Status: SHIPPED | OUTPATIENT
Start: 2021-08-11 | End: 2022-03-28

## 2021-08-11 NOTE — TELEPHONE ENCOUNTER
Patient states that [facility] only has 2 of his Rxs available (Lisinopril/Amlodipine) and NO INHALERS. Can someone call to verify this and if not available today, find out how soon they can get them or call into another pharmacy. Patient states he is out of his inhalers.

## 2021-08-11 NOTE — TELEPHONE ENCOUNTER
Called and spoke to pharmacy and they stated that they did not receive the Rx for the inhaler.   Rx re teed up please look over and send to pharmacy

## 2021-08-11 NOTE — TELEPHONE ENCOUNTER
----- Message from Judge Biggs sent at 8/11/2021  6:28 PM EDT -----  Subject: Medication Problem    QUESTIONS  Name of Medication? albuterol sulfate HFA (VENTOLIN HFA) 108 (90 Base)   MCG/ACT inhaler  Patient-reported dosage and instructions? one puff every four hours   What question or problem do you have with the medication? pt called in for   refill and was informed by office it will be sent to pharmacy and when pt   went to  he was inform they did not receive his order and pt is   completely out . Preferred Pharmacy? [address] [facility] [address] [phone] - F [phone]  Pharmacy phone number (if available)? [phone]  Additional Information for Provider? pt is out of inhaler and is in need   of a refill due to breathing complication when advised to speak to a nurse   he refused and disconnected  ---------------------------------------------------------------------------  --------------  [address]  What is the best way for the office to contact you? OK to leave message on   voicemail  Preferred Call Back Phone Number? [phone]  ---------------------------------------------------------------------------  --------------  SCRIPT ANSWERS  Relationship to Patient?  Self

## 2021-08-11 NOTE — TELEPHONE ENCOUNTER
Office has been notified that pt is requiring Prior Authorization for the following medication:   Lisinopril 20 mg tablets    Please initiate this request through CoverMyMeds, contacting the following Payor/Insurance:[employer]      Please see below, or the documentation attached to this encounter for any additional information that may assist in processing PA   Please provide icd CODE and send to prior authorization team    Thank you!

## 2021-08-12 ENCOUNTER — TELEPHONE (OUTPATIENT)
Dept: FAMILY MEDICINE CLINIC | Age: 51
End: 2021-08-12

## 2021-08-12 DIAGNOSIS — I10 ESSENTIAL HYPERTENSION: ICD-10-CM

## 2021-08-12 RX ORDER — LISINOPRIL 40 MG/1
40 TABLET ORAL DAILY
Qty: 90 TABLET | Refills: 1 | Status: SHIPPED | OUTPATIENT
Start: 2021-08-12 | End: 2021-12-03

## 2021-08-12 NOTE — TELEPHONE ENCOUNTER
lisinopril (PRINIVIL;ZESTRIL) 20 MG tablet        Pharmacy called stated that insurance will only cover 1 tablet per day. They want to switch patient to 40MG tablets, once a day. Instead of 20MG twice a day.

## 2021-08-12 NOTE — TELEPHONE ENCOUNTER
Medication:   Requested Prescriptions     Pending Prescriptions Disp Refills    lisinopril (PRINIVIL;ZESTRIL) 40 MG tablet 90 tablet 1     Sig: Take 1 tablet by mouth daily       Last Filled:  8/10/2021    Patient Phone Number: [phone] (home)     Last appt: 8/10/2021   Next appt: Visit date not found    Lab Results   Component Value Date     02/02/2011    K 4.0 02/02/2011     02/02/2011    CO2 30 02/02/2011    BUN 9 02/02/2011    CREATININE 0.9 02/02/2011    GLUCOSE 95 02/02/2011    CALCIUM 9.3 02/02/2011    GFRAA >60 02/02/2011

## 2021-08-16 NOTE — TELEPHONE ENCOUNTER
Called and spoke with Mary regarding a new PA for pLisinopril 40MG tablets for 1 time daily. There is no prior authorization needed for this medication and patient is able to received 90 per 90 days supply. Please notify patient. Thank you.

## 2021-10-13 ENCOUNTER — TELEPHONE (OUTPATIENT)
Dept: FAMILY MEDICINE CLINIC | Age: 51
End: 2021-10-13

## 2021-10-13 NOTE — TELEPHONE ENCOUNTER
Action requested- Cologuard Non Compliance. Please call patient and ask them to complete cologaurd and return in the mail as soon as possible.

## 2021-11-16 ENCOUNTER — HOSPITAL ENCOUNTER (OUTPATIENT)
Age: 51
Setting detail: OBSERVATION
Discharge: HOME | End: 2021-11-17
Attending: EMERGENCY MEDICINE | Admitting: INTERNAL MEDICINE
Payer: MEDICAID

## 2021-11-16 ENCOUNTER — APPOINTMENT (OUTPATIENT)
Dept: GENERAL RADIOLOGY | Age: 51
End: 2021-11-16
Payer: MEDICAID

## 2021-11-16 ENCOUNTER — APPOINTMENT (OUTPATIENT)
Dept: NUCLEAR MEDICINE | Age: 51
End: 2021-11-16
Payer: MEDICAID

## 2021-11-16 ENCOUNTER — APPOINTMENT (OUTPATIENT)
Dept: CT IMAGING | Age: 51
End: 2021-11-16
Payer: MEDICAID

## 2021-11-16 DIAGNOSIS — R03.0 ELEVATED BLOOD PRESSURE READING: ICD-10-CM

## 2021-11-16 DIAGNOSIS — R07.89 CHEST DISCOMFORT: Primary | ICD-10-CM

## 2021-11-16 PROBLEM — R07.9 CHEST PAIN: Status: ACTIVE | Noted: 2021-11-16

## 2021-11-16 LAB
A/G RATIO: 1.3 (ref 1.1–2.2)
ALBUMIN SERPL-MCNC: 3.9 G/DL (ref 3.4–5)
ALP BLD-CCNC: 86 U/L (ref 40–129)
ALT SERPL-CCNC: 16 U/L (ref 10–40)
ANION GAP SERPL CALCULATED.3IONS-SCNC: 12 MMOL/L (ref 3–16)
AST SERPL-CCNC: 22 U/L (ref 15–37)
BASOPHILS ABSOLUTE: 0.1 K/UL (ref 0–0.2)
BASOPHILS RELATIVE PERCENT: 0.8 %
BILIRUB SERPL-MCNC: 0.7 MG/DL (ref 0–1)
BILIRUBIN URINE: ABNORMAL
BLOOD, URINE: NEGATIVE
BUN BLDV-MCNC: 15 MG/DL (ref 7–20)
CALCIUM SERPL-MCNC: 9 MG/DL (ref 8.3–10.6)
CHLORIDE BLD-SCNC: 105 MMOL/L (ref 99–110)
CLARITY: CLEAR
CO2: 23 MMOL/L (ref 21–32)
COLOR: ABNORMAL
CREAT SERPL-MCNC: 0.8 MG/DL (ref 0.9–1.3)
D DIMER: 464 NG/ML DDU (ref 0–229)
EKG ATRIAL RATE: 68 BPM
EKG DIAGNOSIS: NORMAL
EKG P AXIS: 15 DEGREES
EKG P-R INTERVAL: 164 MS
EKG Q-T INTERVAL: 410 MS
EKG QRS DURATION: 92 MS
EKG QTC CALCULATION (BAZETT): 435 MS
EKG R AXIS: -3 DEGREES
EKG T AXIS: 44 DEGREES
EKG VENTRICULAR RATE: 68 BPM
EOSINOPHILS ABSOLUTE: 0.7 K/UL (ref 0–0.6)
EOSINOPHILS RELATIVE PERCENT: 8.1 %
EPITHELIAL CELLS, UA: 1 /HPF (ref 0–5)
GFR AFRICAN AMERICAN: >60
GFR NON-AFRICAN AMERICAN: >60
GLUCOSE BLD-MCNC: 111 MG/DL (ref 70–99)
GLUCOSE BLD-MCNC: 165 MG/DL (ref 70–99)
GLUCOSE URINE: NEGATIVE MG/DL
HCT VFR BLD CALC: 43.5 % (ref 40.5–52.5)
HEMOGLOBIN: 15.1 G/DL (ref 13.5–17.5)
HYALINE CASTS: 3 /LPF (ref 0–8)
KETONES, URINE: ABNORMAL MG/DL
LEUKOCYTE ESTERASE, URINE: NEGATIVE
LIPASE: 30 U/L (ref 13–60)
LYMPHOCYTES ABSOLUTE: 1.6 K/UL (ref 1–5.1)
LYMPHOCYTES RELATIVE PERCENT: 18.3 %
MAGNESIUM: 2 MG/DL (ref 1.8–2.4)
MAGNESIUM: 2 MG/DL (ref 1.8–2.4)
MCH RBC QN AUTO: 30.3 PG (ref 26–34)
MCHC RBC AUTO-ENTMCNC: 34.7 G/DL (ref 31–36)
MCV RBC AUTO: 87.3 FL (ref 80–100)
MICROSCOPIC EXAMINATION: YES
MONOCYTES ABSOLUTE: 0.8 K/UL (ref 0–1.3)
MONOCYTES RELATIVE PERCENT: 8.7 %
NEUTROPHILS ABSOLUTE: 5.7 K/UL (ref 1.7–7.7)
NEUTROPHILS RELATIVE PERCENT: 64.1 %
NITRITE, URINE: NEGATIVE
PDW BLD-RTO: 13.6 % (ref 12.4–15.4)
PERFORMED ON: ABNORMAL
PH UA: 5.5 (ref 5–8)
PLATELET # BLD: 258 K/UL (ref 135–450)
PMV BLD AUTO: 7.6 FL (ref 5–10.5)
POTASSIUM REFLEX MAGNESIUM: 3.4 MMOL/L (ref 3.5–5.1)
PRO-BNP: 398 PG/ML (ref 0–124)
PROTEIN UA: ABNORMAL MG/DL
RBC # BLD: 4.98 M/UL (ref 4.2–5.9)
RBC UA: 1 /HPF (ref 0–4)
SODIUM BLD-SCNC: 140 MMOL/L (ref 136–145)
SPECIFIC GRAVITY UA: >1.03 (ref 1–1.03)
TOTAL PROTEIN: 6.8 G/DL (ref 6.4–8.2)
TROPONIN: <0.01 NG/ML
TROPONIN: <0.01 NG/ML
URINE REFLEX TO CULTURE: ABNORMAL
URINE TYPE: ABNORMAL
UROBILINOGEN, URINE: 1 E.U./DL
WBC # BLD: 8.9 K/UL (ref 4–11)
WBC UA: 1 /HPF (ref 0–5)

## 2021-11-16 PROCEDURE — 6370000000 HC RX 637 (ALT 250 FOR IP): Performed by: PHYSICIAN ASSISTANT

## 2021-11-16 PROCEDURE — 83735 ASSAY OF MAGNESIUM: CPT

## 2021-11-16 PROCEDURE — 94760 N-INVAS EAR/PLS OXIMETRY 1: CPT

## 2021-11-16 PROCEDURE — 96374 THER/PROPH/DIAG INJ IV PUSH: CPT

## 2021-11-16 PROCEDURE — 85025 COMPLETE CBC W/AUTO DIFF WBC: CPT

## 2021-11-16 PROCEDURE — 71045 X-RAY EXAM CHEST 1 VIEW: CPT

## 2021-11-16 PROCEDURE — 84484 ASSAY OF TROPONIN QUANT: CPT

## 2021-11-16 PROCEDURE — 85379 FIBRIN DEGRADATION QUANT: CPT

## 2021-11-16 PROCEDURE — 93005 ELECTROCARDIOGRAM TRACING: CPT | Performed by: EMERGENCY MEDICINE

## 2021-11-16 PROCEDURE — 83880 ASSAY OF NATRIURETIC PEPTIDE: CPT

## 2021-11-16 PROCEDURE — 78582 LUNG VENTILAT&PERFUS IMAGING: CPT

## 2021-11-16 PROCEDURE — G0378 HOSPITAL OBSERVATION PER HR: HCPCS

## 2021-11-16 PROCEDURE — 6360000002 HC RX W HCPCS: Performed by: PHYSICIAN ASSISTANT

## 2021-11-16 PROCEDURE — 3430000000 HC RX DIAGNOSTIC RADIOPHARMACEUTICAL: Performed by: PHYSICIAN ASSISTANT

## 2021-11-16 PROCEDURE — 80053 COMPREHEN METABOLIC PANEL: CPT

## 2021-11-16 PROCEDURE — 83690 ASSAY OF LIPASE: CPT

## 2021-11-16 PROCEDURE — A9540 TC99M MAA: HCPCS | Performed by: PHYSICIAN ASSISTANT

## 2021-11-16 PROCEDURE — A9558 XE133 XENON 10MCI: HCPCS | Performed by: PHYSICIAN ASSISTANT

## 2021-11-16 PROCEDURE — 2580000003 HC RX 258: Performed by: PHYSICIAN ASSISTANT

## 2021-11-16 PROCEDURE — 93010 ELECTROCARDIOGRAM REPORT: CPT | Performed by: INTERNAL MEDICINE

## 2021-11-16 PROCEDURE — 99284 EMERGENCY DEPT VISIT MOD MDM: CPT

## 2021-11-16 PROCEDURE — 81001 URINALYSIS AUTO W/SCOPE: CPT

## 2021-11-16 RX ORDER — INSULIN LISPRO 100 [IU]/ML
0-3 INJECTION, SOLUTION INTRAVENOUS; SUBCUTANEOUS NIGHTLY
Status: DISCONTINUED | OUTPATIENT
Start: 2021-11-16 | End: 2021-11-17 | Stop reason: HOSPADM

## 2021-11-16 RX ORDER — 0.9 % SODIUM CHLORIDE 0.9 %
1000 INTRAVENOUS SOLUTION INTRAVENOUS ONCE
Status: DISCONTINUED | OUTPATIENT
Start: 2021-11-16 | End: 2021-11-16

## 2021-11-16 RX ORDER — ONDANSETRON 2 MG/ML
4 INJECTION INTRAMUSCULAR; INTRAVENOUS EVERY 6 HOURS PRN
Status: DISCONTINUED | OUTPATIENT
Start: 2021-11-16 | End: 2021-11-17 | Stop reason: HOSPADM

## 2021-11-16 RX ORDER — ACETAMINOPHEN 650 MG/1
650 SUPPOSITORY RECTAL EVERY 6 HOURS PRN
Status: DISCONTINUED | OUTPATIENT
Start: 2021-11-16 | End: 2021-11-17 | Stop reason: HOSPADM

## 2021-11-16 RX ORDER — BUDESONIDE AND FORMOTEROL FUMARATE DIHYDRATE 160; 4.5 UG/1; UG/1
2 AEROSOL RESPIRATORY (INHALATION) 2 TIMES DAILY
Status: DISCONTINUED | OUTPATIENT
Start: 2021-11-16 | End: 2021-11-17 | Stop reason: HOSPADM

## 2021-11-16 RX ORDER — DEXTROSE MONOHYDRATE 50 MG/ML
100 INJECTION, SOLUTION INTRAVENOUS PRN
Status: DISCONTINUED | OUTPATIENT
Start: 2021-11-16 | End: 2021-11-17 | Stop reason: HOSPADM

## 2021-11-16 RX ORDER — LISINOPRIL 20 MG/1
40 TABLET ORAL DAILY
Status: DISCONTINUED | OUTPATIENT
Start: 2021-11-17 | End: 2021-11-17 | Stop reason: HOSPADM

## 2021-11-16 RX ORDER — MORPHINE SULFATE 4 MG/ML
4 INJECTION, SOLUTION INTRAMUSCULAR; INTRAVENOUS ONCE
Status: COMPLETED | OUTPATIENT
Start: 2021-11-16 | End: 2021-11-16

## 2021-11-16 RX ORDER — INSULIN LISPRO 100 [IU]/ML
0-6 INJECTION, SOLUTION INTRAVENOUS; SUBCUTANEOUS
Status: DISCONTINUED | OUTPATIENT
Start: 2021-11-17 | End: 2021-11-17 | Stop reason: HOSPADM

## 2021-11-16 RX ORDER — ALBUTEROL SULFATE 90 UG/1
1 AEROSOL, METERED RESPIRATORY (INHALATION) EVERY 4 HOURS PRN
Status: DISCONTINUED | OUTPATIENT
Start: 2021-11-16 | End: 2021-11-17 | Stop reason: HOSPADM

## 2021-11-16 RX ORDER — KETOROLAC TROMETHAMINE 30 MG/ML
30 INJECTION, SOLUTION INTRAMUSCULAR; INTRAVENOUS EVERY 6 HOURS PRN
Status: DISCONTINUED | OUTPATIENT
Start: 2021-11-16 | End: 2021-11-17 | Stop reason: HOSPADM

## 2021-11-16 RX ORDER — DEXTROSE MONOHYDRATE 25 G/50ML
12.5 INJECTION, SOLUTION INTRAVENOUS PRN
Status: DISCONTINUED | OUTPATIENT
Start: 2021-11-16 | End: 2021-11-17 | Stop reason: HOSPADM

## 2021-11-16 RX ORDER — SODIUM CHLORIDE 9 MG/ML
25 INJECTION, SOLUTION INTRAVENOUS PRN
Status: DISCONTINUED | OUTPATIENT
Start: 2021-11-16 | End: 2021-11-17 | Stop reason: HOSPADM

## 2021-11-16 RX ORDER — ACETAMINOPHEN 325 MG/1
650 TABLET ORAL EVERY 6 HOURS PRN
Status: DISCONTINUED | OUTPATIENT
Start: 2021-11-16 | End: 2021-11-17 | Stop reason: HOSPADM

## 2021-11-16 RX ORDER — ASPIRIN 81 MG/1
324 TABLET, CHEWABLE ORAL ONCE
Status: COMPLETED | OUTPATIENT
Start: 2021-11-16 | End: 2021-11-16

## 2021-11-16 RX ORDER — NICOTINE POLACRILEX 4 MG
15 LOZENGE BUCCAL PRN
Status: DISCONTINUED | OUTPATIENT
Start: 2021-11-16 | End: 2021-11-17 | Stop reason: HOSPADM

## 2021-11-16 RX ORDER — METHOCARBAMOL 750 MG/1
750 TABLET, FILM COATED ORAL ONCE
Status: COMPLETED | OUTPATIENT
Start: 2021-11-17 | End: 2021-11-17

## 2021-11-16 RX ORDER — ATORVASTATIN CALCIUM 40 MG/1
40 TABLET, FILM COATED ORAL NIGHTLY
Status: DISCONTINUED | OUTPATIENT
Start: 2021-11-16 | End: 2021-11-17 | Stop reason: HOSPADM

## 2021-11-16 RX ORDER — NITROGLYCERIN 0.4 MG/1
0.4 TABLET SUBLINGUAL EVERY 5 MIN PRN
Status: DISCONTINUED | OUTPATIENT
Start: 2021-11-16 | End: 2021-11-17 | Stop reason: HOSPADM

## 2021-11-16 RX ORDER — SODIUM CHLORIDE 0.9 % (FLUSH) 0.9 %
10 SYRINGE (ML) INJECTION EVERY 12 HOURS SCHEDULED
Status: DISCONTINUED | OUTPATIENT
Start: 2021-11-16 | End: 2021-11-17 | Stop reason: HOSPADM

## 2021-11-16 RX ORDER — XENON XE-133 10 MCI/1
9.15 GAS RESPIRATORY (INHALATION)
Status: COMPLETED | OUTPATIENT
Start: 2021-11-16 | End: 2021-11-16

## 2021-11-16 RX ORDER — PANTOPRAZOLE SODIUM 40 MG/1
40 TABLET, DELAYED RELEASE ORAL
Status: DISCONTINUED | OUTPATIENT
Start: 2021-11-17 | End: 2021-11-17 | Stop reason: HOSPADM

## 2021-11-16 RX ORDER — POTASSIUM CHLORIDE 7.45 MG/ML
10 INJECTION INTRAVENOUS PRN
Status: DISCONTINUED | OUTPATIENT
Start: 2021-11-16 | End: 2021-11-17 | Stop reason: HOSPADM

## 2021-11-16 RX ORDER — AMLODIPINE BESYLATE 5 MG/1
10 TABLET ORAL DAILY
Status: DISCONTINUED | OUTPATIENT
Start: 2021-11-17 | End: 2021-11-17 | Stop reason: HOSPADM

## 2021-11-16 RX ORDER — ONDANSETRON 2 MG/ML
4 INJECTION INTRAMUSCULAR; INTRAVENOUS ONCE
Status: DISCONTINUED | OUTPATIENT
Start: 2021-11-16 | End: 2021-11-16 | Stop reason: HOSPADM

## 2021-11-16 RX ORDER — SODIUM CHLORIDE 0.9 % (FLUSH) 0.9 %
10 SYRINGE (ML) INJECTION PRN
Status: DISCONTINUED | OUTPATIENT
Start: 2021-11-16 | End: 2021-11-17 | Stop reason: HOSPADM

## 2021-11-16 RX ORDER — POTASSIUM CHLORIDE 20 MEQ/1
40 TABLET, EXTENDED RELEASE ORAL PRN
Status: DISCONTINUED | OUTPATIENT
Start: 2021-11-16 | End: 2021-11-17 | Stop reason: HOSPADM

## 2021-11-16 RX ORDER — SODIUM CHLORIDE 0.9 % (FLUSH) 0.9 %
5-40 SYRINGE (ML) INJECTION PRN
Status: DISCONTINUED | OUTPATIENT
Start: 2021-11-16 | End: 2021-11-16 | Stop reason: SDUPTHER

## 2021-11-16 RX ORDER — ASPIRIN 81 MG/1
81 TABLET, CHEWABLE ORAL DAILY
Status: DISCONTINUED | OUTPATIENT
Start: 2021-11-17 | End: 2021-11-17 | Stop reason: HOSPADM

## 2021-11-16 RX ADMIN — LIDOCAINE HYDROCHLORIDE: 20 SOLUTION ORAL; TOPICAL at 13:18

## 2021-11-16 RX ADMIN — MORPHINE SULFATE 4 MG: 4 INJECTION INTRAVENOUS at 20:31

## 2021-11-16 RX ADMIN — Medication 10 ML: at 16:59

## 2021-11-16 RX ADMIN — Medication 8.13 MILLICURIE: at 16:59

## 2021-11-16 RX ADMIN — NITROGLYCERIN 0.5 INCH: 20 OINTMENT TOPICAL at 19:58

## 2021-11-16 RX ADMIN — XENON XE-133 9.15 MILLICURIE: 10 GAS RESPIRATORY (INHALATION) at 16:59

## 2021-11-16 RX ADMIN — ASPIRIN 81 MG 324 MG: 81 TABLET ORAL at 19:58

## 2021-11-16 ASSESSMENT — ENCOUNTER SYMPTOMS
NAUSEA: 0
WHEEZING: 0
DIARRHEA: 0
CHEST TIGHTNESS: 0
PHOTOPHOBIA: 0
COUGH: 0
APNEA: 0
CONSTIPATION: 0
SHORTNESS OF BREATH: 1
COLOR CHANGE: 0
ABDOMINAL PAIN: 0
STRIDOR: 0
VOMITING: 0
BACK PAIN: 0

## 2021-11-16 ASSESSMENT — PAIN DESCRIPTION - PAIN TYPE
TYPE: ACUTE PAIN
TYPE: ACUTE PAIN

## 2021-11-16 ASSESSMENT — PAIN DESCRIPTION - DESCRIPTORS
DESCRIPTORS: ACHING
DESCRIPTORS: ACHING

## 2021-11-16 ASSESSMENT — PAIN SCALES - GENERAL
PAINLEVEL_OUTOF10: 7
PAINLEVEL_OUTOF10: 5

## 2021-11-16 ASSESSMENT — PAIN DESCRIPTION - LOCATION
LOCATION: CHEST
LOCATION: CHEST

## 2021-11-16 NOTE — ED PROVIDER NOTES
[facility] [facility]        Pt Name: Giorgio Burlesno  MRN: [id number]  [place] 1970  Date of evaluation: 11/16/2021  Provider: MARCIA Heredia  PCP: DYLON Chirinos CNP  Note Started: 1:43 PM EST        I have seen and evaluated this patient with my supervising physician Cinda Mason MD.    [address] [place] [address]       Chief Complaint   Patient presents with    Chest Pain     Believes he pulled a muscle in his chest while sleeping several nights ago; reports worsening aching, 7/10 to center of chest with some SOB & dizziness. HISTORY OF PRESENT ILLNESS   (Location, Timing/Onset, Context/Setting, Quality, Duration, Modifying Factors, Severity, Associated Signs and Symptoms)  Note limiting factors. Chief Complaint: Chest pain    Giorgio Burleson is a 54 y.o. male with past medical history of asthma and hypertension who presents to the ED with complaint of chest pain. Patient states about 2 to 3 days ago he woke up with some pain to his substernal chest.  Patient states he was concerned he pulled a muscle while sleeping. Patient states pain seemed to have subsided but states returned this morning when he woke up. States pain to his substernal chest and also to his right-sided rib cage worsened with inspiration. States he does feel short of breath and feels like he cannot take a deep breath. Patient denies any cough, hemoptysis, orthopnea, pedal edema or calf tenderness. Denies abdominal pain but states he has had some nausea. Denies any vomiting, urinary symptoms or change in bowel movements. Patient states he has some lightheadedness but denies any syncope, near syncope, dizziness contrary to nursing triage note or headache. Denies any diaphoresis. He denies any history of heart or lung problems. States he never had cardiac work-up in the past.  Patient states he is a smoker.   States he has history of hypertension but denies any history of hyperlipidemia or diabetes. Denies history of PE but states he has had DVTs in the past.  He is not on any blood thinners. Denies any recent travel, trips, surgery or immobilization. Nursing Notes were all reviewed and agreed with or any disagreements were addressed in the HPI. REVIEW OF SYSTEMS    (2-9 systems for level 4, 10 or more for level 5)     Review of Systems   Constitutional: Negative for activity change, appetite change, chills, diaphoresis, fatigue and fever. Eyes: Negative for photophobia and visual disturbance. Respiratory: Positive for shortness of breath. Negative for apnea, cough, chest tightness, wheezing and stridor. Cardiovascular: Positive for chest pain. Negative for palpitations and leg swelling. Gastrointestinal: Negative for abdominal pain, constipation, diarrhea, nausea and vomiting. Genitourinary: Negative for decreased urine volume, difficulty urinating, dysuria, flank pain, frequency, hematuria and urgency. Musculoskeletal: Negative for arthralgias, back pain, myalgias, neck pain and neck stiffness. Skin: Negative for color change, pallor, rash and wound. Neurological: Negative for dizziness, tremors, seizures, syncope, facial asymmetry, speech difficulty, weakness, light-headedness, numbness and headaches. Positives and Pertinent negatives as per HPI. Except as noted above in the ROS, all other systems were reviewed and negative.        PAST MEDICAL HISTORY     Past Medical History:   Diagnosis Date    Asthma     Depression     Hx of blood clots     Hypertension          SURGICAL HISTORY     Past Surgical History:   Procedure Laterality Date    INCOMPLETE CIRCUMCISION REPAIR           CURRENTMEDICATIONS       Previous Medications    ALBUTEROL SULFATE HFA (VENTOLIN HFA) 108 (90 BASE) MCG/ACT INHALER    Inhale 1 puff into the lungs every 4 hours as needed for Wheezing    AMLODIPINE (NORVASC) 10 MG TABLET    Take 1 tablet by mouth daily    LISINOPRIL (PRINIVIL;ZESTRIL) 40 MG TABLET    Take 1 tablet by mouth daily    MOMETASONE-FORMOTEROL (DULERA) 200-5 MCG/ACT INHALER    Inhale 2 puffs into the lungs 2 times daily    OMEPRAZOLE (PRILOSEC) 20 MG DELAYED RELEASE CAPSULE    Take 1 capsule by mouth Daily         ALLERGIES     Patient has no known allergies. FAMILYHISTORY     History reviewed. No pertinent family history. SOCIAL HISTORY       Social History     Tobacco Use    Smoking status: Former Smoker     Quit date: 2002     Years since quittin.3    Smokeless tobacco: Former User     Quit date: 1995   Vaping Use    Vaping Use: Never used   Substance Use Topics    Alcohol use: Never    Drug use: Yes     Types: Marijuana (Weed)       SCREENINGS             PHYSICAL EXAM    (up to 7 for level 4, 8 or more for level 5)     ED Triage Vitals [21 1253]   BP Temp Temp src Pulse Resp SpO2 Height Weight   132/82 98.1 °F (36.7 °C) -- 66 22 98 % 6' 4\" (1.93 m) (!) 520 lb (235.9 kg)       Physical Exam  Constitutional:       General: He is not in acute distress. Appearance: Normal appearance. He is well-developed. He is not ill-appearing, toxic-appearing or diaphoretic. HENT:      Head: Normocephalic and atraumatic. Right Ear: External ear normal.      Left Ear: External ear normal.   Eyes:      General:         Right eye: No discharge. Left eye: No discharge. Conjunctiva/sclera: Conjunctivae normal.   Cardiovascular:      Rate and Rhythm: Normal rate and regular rhythm. Pulses: Normal pulses. Heart sounds: Normal heart sounds. No murmur heard. No friction rub. No gallop. Comments: 2+ radial pulse bilaterally. No pedal edema. No calf tenderness. No JVD. Pulmonary:      Effort: Pulmonary effort is normal. No respiratory distress. Breath sounds: Normal breath sounds. No stridor. No wheezing, rhonchi or rales. Chest:      Chest wall: No tenderness. Abdominal:      General: Abdomen is flat. Bowel sounds are normal. There is no distension. Palpations: Abdomen is soft. There is no mass. Tenderness: There is no abdominal tenderness. There is no right CVA tenderness, left CVA tenderness, guarding or rebound. Negative signs include Hillman's sign, Rovsing's sign and McBurney's sign. Hernia: No hernia is present. Musculoskeletal:         General: Normal range of motion. Cervical back: Normal range of motion and neck supple. Skin:     General: Skin is warm and dry. Coloration: Skin is not pale. Findings: No erythema or rash. Neurological:      Mental Status: He is alert and oriented to person, place, and time.    Psychiatric:         Behavior: Behavior normal.         DIAGNOSTIC RESULTS   LABS:    Labs Reviewed   CBC WITH AUTO DIFFERENTIAL - Abnormal; Notable for the following components:       Result Value    Eosinophils Absolute 0.7 (*)     All other components within normal limits    Narrative:     Performed at:  [facility] [address], [place], [address]   Phone [phone]   COMPREHENSIVE METABOLIC PANEL W/ REFLEX TO MG FOR LOW K - Abnormal; Notable for the following components:    Potassium reflex Magnesium 3.4 (*)     Glucose 165 (*)     CREATININE 0.8 (*)     All other components within normal limits    Narrative:     Performed at:  [facility] [address], [place], [address]   Phone [phone]   BRAIN NATRIURETIC PEPTIDE - Abnormal; Notable for the following components:    Pro- (*)     All other components within normal limits    Narrative:     Performed at:  [facility] [address], [place], [address]   Phone [phone]   D-DIMER, QUANTITATIVE - Abnormal; Notable for the following components:    D-Dimer, Quant 464 (*)     All other components within normal limits    Narrative: Performed at:  [facility]  [address] [place], [address]   Phone [phone]   URINE RT REFLEX TO CULTURE - Abnormal; Notable for the following components:    Bilirubin Urine SMALL (*)     Ketones, Urine TRACE (*)     Protein, UA TRACE (*)     All other components within normal limits    Narrative:     Performed at:  [facility] [address] [place], [address]   Phone [phone]   LIPASE    Narrative:     Performed at:  [facility] [address] [employer],  [place], [address]   Phone [phone]   TROPONIN    Narrative:     Performed at:  [facility] [address], [address]   Phone [phone]   MAGNESIUM    Narrative:     Performed at:  [facility] [address], [address]   Phone [phone]   MICROSCOPIC URINALYSIS    Narrative:     Performed at:  [facility] [address], [address]   Phone [phone]   MAGNESIUM       When ordered only abnormal lab results are displayed. All other labs were within normal range or not returned as of this dictation. EKG: When ordered, EKG's are interpreted by the Emergency Department Physician in the absence of a cardiologist.  Please see their note for interpretation of EKG. RADIOLOGY:   Non-plain film images such as CT, Ultrasound and MRI are read by the radiologist. Plain radiographic images are visualized and preliminarily interpreted by the ED Provider with the below findings:        Interpretation per the Radiologist below, if available at the time of this note:    NM LUNG VENT/PERFUSION (VQ)   Final Result   Low probability for pulmonary embolus.          XR CHEST PORTABLE   Final Result   Cardiomegaly with pulmonary vascular congestion           XR CHEST PORTABLE    Result Date: 11/16/2021  EXAMINATION: ONE XRAY VIEW OF THE CHEST 11/16/2021 1:04 pm COMPARISON: 10/10/2006 HISTORY: ORDERING SYSTEM PROVIDED HISTORY: cp TECHNOLOGIST PROVIDED HISTORY: Reason for exam:->cp Acuity: Unknown FINDINGS: Cardiomegaly. No acute airspace disease. Pulmonary vascular congestion. No pneumothorax. No pleural effusion. Cardiomegaly with pulmonary vascular congestion           PROCEDURES   Unless otherwise noted below, none     Procedures    CRITICAL CARE TIME   N/A    CONSULTS:  IP CONSULT TO CARDIOLOGY      EMERGENCY DEPARTMENT COURSE and DIFFERENTIAL DIAGNOSIS/MDM:   Vitals:    Vitals:    11/16/21 1545 11/16/21 1910 11/16/21 1925 11/16/21 1940   BP: (!) 162/102 (!) 179/99 (!) 177/120    Pulse: 72 77 70 79   Resp: 18 (!) 33 18 26   Temp:       SpO2: 96%  97% 96%   Weight:       Height:           Patient was given the following medications:  Medications   ondansetron (ZOFRAN) injection 4 mg (has no administration in time range)   sodium chloride flush 0.9 % injection 5-40 mL (10 mLs IntraVENous Given 11/16/21 1659)   morphine injection 4 mg (4 mg IntraVENous Given 11/16/21 2031)   aluminum & magnesium hydroxide-simethicone (MAALOX) 30 mL, lidocaine viscous hcl (XYLOCAINE) 5 mL (GI COCKTAIL) ( Oral Given 11/16/21 1318)   xenon xe 583 gas 2.94 millicurie (9.33 millicuries Inhalation Given 11/16/21 1659)   technetium albumin aggregated (MAA) solution 0.90 millicurie (6.70 millicuries IntraVENous Given 11/16/21 1659)   aspirin chewable tablet 324 mg (324 mg Oral Given 11/16/21 1958)   nitroglycerin (NITRO-BID) 2 % ointment 0.5 inch (0.5 inches Topical Given 11/16/21 1958)           Patient is a 53-year-old male who presents to the ED with complaint of chest pain. Chest pain that radiates into the rib cage. States worse when he takes a deep breath. Has a history of previous blood clots not on anticoagulation. IV established and blood work obtained. Urinalysis showed trace ketones otherwise unremarkable.   CBC showed normal white count, hemoglobin and platelets. CMP relatively unremarkable. Lipase was normal.  Troponin normal.  . D-dimer elevated at 464. Magnesium normal.  Chest x-ray showed cardiomegaly with pulmonary vascular congestion. EKG interpreted by attending. Did order CTA of the chest initially but unfortunately patient's weight 520 and unfortunately he exceeds the weight limit on CT scan. VQ scan obtained. VQ showed low probability of PE. He still endorses chest pain. No previous cardiac work-up. Given patient's continued endorsement of chest pain here in the emergency department with no previous cardiac work-up believe he would benefit from mission for further evaluation and treatment and chest pain rule out. Case discussed with hospitalist service who graciously agreed accept patient for admission at this time. FINAL IMPRESSION      1. Chest discomfort    2. Elevated blood pressure reading          DISPOSITION/PLAN   DISPOSITION Admitted 11/16/2021 08:08:56 PM      PATIENT REFERRED TO:  No follow-up provider specified.     DISCHARGE MEDICATIONS:  New Prescriptions    No medications on file       DISCONTINUED MEDICATIONS:  Discontinued Medications    No medications on file              (Please note that portions of this note were completed with a voice recognition program.  Efforts were made to edit the dictations but occasionally words are mis-transcribed.)    MARCIA Hinojosa (electronically signed)         MARCIA Reinoso  11/16/21 2034

## 2021-11-17 VITALS
OXYGEN SATURATION: 95 % | DIASTOLIC BLOOD PRESSURE: 80 MMHG | HEIGHT: 76 IN | RESPIRATION RATE: 17 BRPM | TEMPERATURE: 97.6 F | WEIGHT: 315 LBS | BODY MASS INDEX: 38.36 KG/M2 | HEART RATE: 75 BPM | SYSTOLIC BLOOD PRESSURE: 136 MMHG

## 2021-11-17 PROBLEM — E66.01 MORBID OBESITY WITH BMI OF 60.0-69.9, ADULT (HCC): Status: ACTIVE | Noted: 2021-11-17

## 2021-11-17 PROBLEM — R73.9 HYPERGLYCEMIA: Status: ACTIVE | Noted: 2021-11-17

## 2021-11-17 PROBLEM — I42.9 CARDIOMYOPATHY (HCC): Status: ACTIVE | Noted: 2021-11-17

## 2021-11-17 PROBLEM — I10 HTN (HYPERTENSION): Status: ACTIVE | Noted: 2021-11-17

## 2021-11-17 PROBLEM — E87.6 HYPOKALEMIA: Status: ACTIVE | Noted: 2021-11-17

## 2021-11-17 LAB
ANION GAP SERPL CALCULATED.3IONS-SCNC: 9 MMOL/L (ref 3–16)
BUN BLDV-MCNC: 11 MG/DL (ref 7–20)
CALCIUM SERPL-MCNC: 8.8 MG/DL (ref 8.3–10.6)
CHLORIDE BLD-SCNC: 106 MMOL/L (ref 99–110)
CHOLESTEROL, TOTAL: 133 MG/DL (ref 0–199)
CO2: 26 MMOL/L (ref 21–32)
CREAT SERPL-MCNC: 0.6 MG/DL (ref 0.9–1.3)
ESTIMATED AVERAGE GLUCOSE: 91.1 MG/DL
GFR AFRICAN AMERICAN: >60
GFR NON-AFRICAN AMERICAN: >60
GLUCOSE BLD-MCNC: 105 MG/DL (ref 70–99)
GLUCOSE BLD-MCNC: 107 MG/DL (ref 70–99)
GLUCOSE BLD-MCNC: 111 MG/DL (ref 70–99)
HBA1C MFR BLD: 4.8 %
HCT VFR BLD CALC: 40.4 % (ref 40.5–52.5)
HDLC SERPL-MCNC: 36 MG/DL (ref 40–60)
HEMOGLOBIN: 14.3 G/DL (ref 13.5–17.5)
LDL CHOLESTEROL CALCULATED: 78 MG/DL
LV EF: 40 %
LVEF MODALITY: NORMAL
MAGNESIUM: 2 MG/DL (ref 1.8–2.4)
MCH RBC QN AUTO: 31 PG (ref 26–34)
MCHC RBC AUTO-ENTMCNC: 35.5 G/DL (ref 31–36)
MCV RBC AUTO: 87.2 FL (ref 80–100)
PDW BLD-RTO: 13.6 % (ref 12.4–15.4)
PERFORMED ON: ABNORMAL
PERFORMED ON: ABNORMAL
PLATELET # BLD: 228 K/UL (ref 135–450)
PMV BLD AUTO: 7.8 FL (ref 5–10.5)
POTASSIUM REFLEX MAGNESIUM: 3.4 MMOL/L (ref 3.5–5.1)
RBC # BLD: 4.63 M/UL (ref 4.2–5.9)
SODIUM BLD-SCNC: 141 MMOL/L (ref 136–145)
TRIGL SERPL-MCNC: 96 MG/DL (ref 0–150)
TROPONIN: <0.01 NG/ML
VLDLC SERPL CALC-MCNC: 19 MG/DL
WBC # BLD: 8.9 K/UL (ref 4–11)

## 2021-11-17 PROCEDURE — 80048 BASIC METABOLIC PNL TOTAL CA: CPT

## 2021-11-17 PROCEDURE — C8929 TTE W OR WO FOL WCON,DOPPLER: HCPCS

## 2021-11-17 PROCEDURE — 6360000002 HC RX W HCPCS: Performed by: INTERNAL MEDICINE

## 2021-11-17 PROCEDURE — 6370000000 HC RX 637 (ALT 250 FOR IP): Performed by: PHYSICIAN ASSISTANT

## 2021-11-17 PROCEDURE — G0378 HOSPITAL OBSERVATION PER HR: HCPCS

## 2021-11-17 PROCEDURE — 83735 ASSAY OF MAGNESIUM: CPT

## 2021-11-17 PROCEDURE — G0008 ADMIN INFLUENZA VIRUS VAC: HCPCS | Performed by: INTERNAL MEDICINE

## 2021-11-17 PROCEDURE — 80061 LIPID PANEL: CPT

## 2021-11-17 PROCEDURE — 36415 COLL VENOUS BLD VENIPUNCTURE: CPT

## 2021-11-17 PROCEDURE — 6360000002 HC RX W HCPCS: Performed by: PHYSICIAN ASSISTANT

## 2021-11-17 PROCEDURE — 96375 TX/PRO/DX INJ NEW DRUG ADDON: CPT

## 2021-11-17 PROCEDURE — 2580000003 HC RX 258: Performed by: PHYSICIAN ASSISTANT

## 2021-11-17 PROCEDURE — 83036 HEMOGLOBIN GLYCOSYLATED A1C: CPT

## 2021-11-17 PROCEDURE — 99244 OFF/OP CNSLTJ NEW/EST MOD 40: CPT | Performed by: INTERNAL MEDICINE

## 2021-11-17 PROCEDURE — 84484 ASSAY OF TROPONIN QUANT: CPT

## 2021-11-17 PROCEDURE — 85027 COMPLETE CBC AUTOMATED: CPT

## 2021-11-17 PROCEDURE — 6360000004 HC RX CONTRAST MEDICATION: Performed by: INTERNAL MEDICINE

## 2021-11-17 PROCEDURE — 90686 IIV4 VACC NO PRSV 0.5 ML IM: CPT | Performed by: INTERNAL MEDICINE

## 2021-11-17 RX ORDER — IBUPROFEN 600 MG/1
600 TABLET ORAL 4 TIMES DAILY PRN
Qty: 40 TABLET | Refills: 0 | Status: SHIPPED | OUTPATIENT
Start: 2021-11-17

## 2021-11-17 RX ORDER — CYCLOBENZAPRINE HCL 5 MG
5 TABLET ORAL 2 TIMES DAILY PRN
Qty: 10 TABLET | Refills: 0 | Status: SHIPPED | OUTPATIENT
Start: 2021-11-17 | End: 2021-11-27

## 2021-11-17 RX ORDER — ALBUTEROL SULFATE 90 UG/1
2 AEROSOL, METERED RESPIRATORY (INHALATION) 2 TIMES DAILY
Status: DISCONTINUED | OUTPATIENT
Start: 2021-11-17 | End: 2021-11-17 | Stop reason: HOSPADM

## 2021-11-17 RX ADMIN — METHOCARBAMOL 750 MG: 750 TABLET ORAL at 00:03

## 2021-11-17 RX ADMIN — AMLODIPINE BESYLATE 10 MG: 5 TABLET ORAL at 08:09

## 2021-11-17 RX ADMIN — PANTOPRAZOLE SODIUM 40 MG: 40 TABLET, DELAYED RELEASE ORAL at 09:10

## 2021-11-17 RX ADMIN — KETOROLAC TROMETHAMINE 30 MG: 30 INJECTION, SOLUTION INTRAMUSCULAR at 00:03

## 2021-11-17 RX ADMIN — PERFLUTREN 1.43 MG: 6.52 INJECTION, SUSPENSION INTRAVENOUS at 10:28

## 2021-11-17 RX ADMIN — Medication 10 ML: at 08:30

## 2021-11-17 RX ADMIN — ASPIRIN 81 MG 81 MG: 81 TABLET ORAL at 08:10

## 2021-11-17 RX ADMIN — INFLUENZA A VIRUS A/VICTORIA/2570/2019 IVR-215 (H1N1) ANTIGEN (PROPIOLACTONE INACTIVATED), INFLUENZA A VIRUS A/CAMBODIA/E0826360/2020 IVR-224 (H3N2) ANTIGEN (PROPIOLACTONE INACTIVATED), INFLUENZA B VIRUS B/VICTORIA/705/2018 BVR-11 ANTIGEN (PROPIOLACTONE INACTIVATED), INFLUENZA B VIRUS B/PHUKET/3073/2013 BVR-1B ANTIGEN (PROPIOLACTONE INACTIVATED) 0.5 ML: 15; 15; 15; 15 INJECTION, SUSPENSION INTRAMUSCULAR at 14:18

## 2021-11-17 RX ADMIN — POTASSIUM CHLORIDE 40 MEQ: 20 TABLET, EXTENDED RELEASE ORAL at 08:10

## 2021-11-17 RX ADMIN — LISINOPRIL 40 MG: 20 TABLET ORAL at 08:10

## 2021-11-17 ASSESSMENT — PAIN SCALES - GENERAL: PAINLEVEL_OUTOF10: 0

## 2021-11-17 NOTE — RT PROTOCOL NOTE
RT Inhaler-Nebulizer Bronchodilator Protocol Note    There is a bronchodilator order in the chart from a provider indicating to follow the RT Bronchodilator Protocol and there is an Initiate RT Inhaler-Nebulizer Bronchodilator Protocol order as well (see protocol at bottom of note). CXR Findings:  XR CHEST PORTABLE    Result Date: 11/16/2021  Cardiomegaly with pulmonary vascular congestion       The findings from the last RT Protocol Assessment were as follows:   History Pulmonary Disease: None or smoker <15 pack years  Respiratory Pattern: Dyspnea on exertion or RR 21-25 bpm  Breath Sounds: Slightly diminished and/or crackles  Cough: Strong, spontaneous, non-productive  Indication for Bronchodilator Therapy: Decreased or absent breath sounds  Bronchodilator Assessment Score: 4    Aerosolized bronchodilator medication orders have been revised according to the RT Inhaler-Nebulizer Bronchodilator Protocol below. Respiratory Therapist to perform RT Therapy Protocol Assessment initially then follow the protocol. Repeat RT Therapy Protocol Assessment PRN for score 0-3 or on second treatment, BID, and PRN for scores above 3. No Indications - adjust the frequency to every 6 hours PRN wheezing or bronchospasm, if no treatments needed after 48 hours then discontinue using Per Protocol order mode. If indication present, adjust the RT bronchodilator orders based on the Bronchodilator Assessment Score as indicated below. Use Inhaler orders unless patient has one or more of the following: on home nebulizer, not able to hold breath for 10 seconds, is not alert and oriented, cannot activate and use MDI correctly, or respiratory rate 25 breaths per minute or more, then use the equivalent nebulizer order(s) with same Frequency and PRN reasons based on the score. If a patient is on this medication at home then do not decrease Frequency below that used at home.     0-3 - enter or revise RT bronchodilator order(s) to equivalent RT Bronchodilator order with Frequency of every 4 hours PRN for wheezing or increased work of breathing using Per Protocol order mode. 4-6 - enter or revise RT Bronchodilator order(s) to two equivalent RT bronchodilator orders with one order with BID Frequency and one order with Frequency of every 4 hours PRN wheezing or increased work of breathing using Per Protocol order mode. 7-10 - enter or revise RT Bronchodilator order(s) to two equivalent RT bronchodilator orders with one order with TID Frequency and one order with Frequency of every 4 hours PRN wheezing or increased work of breathing using Per Protocol order mode. 11-13 - enter or revise RT Bronchodilator order(s) to one equivalent RT bronchodilator order with QID Frequency and an Albuterol order with Frequency of every 4 hours PRN wheezing or increased work of breathing using Per Protocol order mode. Greater than 13 - enter or revise RT Bronchodilator order(s) to one equivalent RT bronchodilator order with every 4 hours Frequency and an Albuterol order with Frequency of every 2 hours PRN wheezing or increased work of breathing using Per Protocol order mode. RT to enter RT Home Evaluation for COPD & MDI Assessment order using Per Protocol order mode.     Electronically signed by Agata Booth RCP on 11/17/2021 at 1:07 AM

## 2021-11-17 NOTE — DISCHARGE SUMMARY
Hospital Medicine Discharge Summary    Patient: Momo Gonzalez     Gender: male  : 1970   Age: 54 y.o. MRN: [id number]    Admitting Physician: Jeet Lamb MD  Discharge Physician: Ladan Coronado MD     Code Status: Full Code     Admit Date: 2021   Discharge Date:   21    Disposition:  Home    Discharge Diagnoses: Active Hospital Problems    Diagnosis Date Noted    Morbid obesity with BMI of 60.0-69.9, adult ([address] [E66.01, Z68.44] 2021    HTN (hypertension) [I10] 2021    Hypokalemia [E87.6] 2021    Hyperglycemia [R73.9] 2021    Chest pain [R07.9] 2021    Asthma [J45.909]        Follow-up appointments:  one week    Outpatient to do list: F/U with PCP. PCP should refer to [facility] Failure program as OP. Condition at Discharge:  Stable    Hospital Course:   53 yo M with HTN, morbid obesity BMI > 60, asthma who came to ER with complaints of CP. Placed in observation. Serial troponin negative. Low probability for PE on  V/Q scan. Seen by Cardiology. Would not be able to be stressed or placed on [facility] table. Echo with    -Technically difficult examination due to limited visualization.   -Definity administered for endocardial border definition.   -Left ventricular cavity size is dilated with mild concentric left   ventricular hypertrophy.   -Overall left ventricular systolic function appears moderately reduced. Ejection fraction is visually estimated to be 40%. -There is moderate hypokinesis of the mid-distal anterolateral wall,   mid-distal anterior wall and the mid-distal inferolateral wall. -Grade I diastolic dysfunction with normal LV filling pressures.   -The right ventricle is mildly enlarged. Right ventricular systolic function   is normal.   -Trivial mitral regurgitation.   -The aortic root is mildly dilated. -Trivial pulmonic regurgitation.     This patient should be referred by PCP to [facility] CHF program to maximize EF management. Cardiology suspected non ischemic cardiomyopathy. Would benefit from PSG to r/o SALVATORE. Patient had reproducible chest pain on exam.  Will be on Ibuprofen x 4 days and will use Flexeril PRN. F/U with PCP and CHF team.    Discharge Medications:   Current Discharge Medication List        Current Discharge Medication List        Current Discharge Medication List      CONTINUE these medications which have NOT CHANGED    Details   lisinopril (PRINIVIL;ZESTRIL) 40 MG tablet Take 1 tablet by mouth daily  Qty: 90 tablet, Refills: 1    Associated Diagnoses: Essential hypertension      albuterol sulfate HFA (VENTOLIN HFA) 108 (90 Base) MCG/ACT inhaler Inhale 1 puff into the lungs every 4 hours as needed for Wheezing  Qty: 1 Inhaler, Refills: 3    Associated Diagnoses: Moderate asthma, unspecified whether complicated, unspecified whether persistent      omeprazole (PRILOSEC) 20 MG delayed release capsule Take 1 capsule by mouth Daily  Qty: 90 capsule, Refills: 1    Associated Diagnoses: Gastroesophageal reflux disease without esophagitis      amLODIPine (NORVASC) 10 MG tablet Take 1 tablet by mouth daily  Qty: 90 tablet, Refills: 1    Associated Diagnoses: Essential hypertension      mometasone-formoterol (DULERA) 200-5 MCG/ACT inhaler Inhale 2 puffs into the lungs 2 times daily  Qty: 1 Inhaler, Refills: 3    Associated Diagnoses: Moderate asthma, unspecified whether complicated, unspecified whether persistent           Current Discharge Medication List          Discharge Exam:    BP (!) 142/78   Pulse 80   Temp 98 °F (36.7 °C) (Oral)   Resp 18   Ht 6' 4\" (1.93 m)   Wt (!) 520 lb (235.9 kg)   SpO2 95%   BMI 63.30 kg/m²   General appearance:  NAD, pleasant, obese  HEENT:   Normal cephalic, atraumatic, moist mucous membranes, no oropharyngeal erythema or exudate  Neck: Supple, trachea midline, no anterior cervical or SC LAD  Heart[de-identified] Normal s1/s2, RRR, no murmurs, gallops, or rubs. No leg edema  Lungs:  No use of accessory musclesNormal respiratory effort. Clear to auscultation, bilaterally without Rales/Wheezes/Rhonchi. Abdomen: Soft, non-tender, non-distended, bowel sounds present, no masses  Musculoskeletal:  Tender to palpation in lower sternum. No clubbing, no cyanosis, no edema  Skin: No lesion or masses  Neurologic:  Neurovascularly intact without any focal sensory/motor deficits. Cranial nerves: II-XII intact, grossly non-focal.  Psychiatric:  A & O x3  Neuro: Grossly intact, moves all four extremities     Labs: For convenience and continuity at follow-up the following most recent labs are provided:    Lab Results   Component Value Date    WBC 8.9 11/17/2021    HGB 14.3 11/17/2021    HCT 40.4 11/17/2021    MCV 87.2 11/17/2021     11/17/2021     11/17/2021    K 3.4 11/17/2021     11/17/2021    CO2 26 11/17/2021    BUN 11 11/17/2021    CREATININE 0.6 11/17/2021    CALCIUM 8.8 11/17/2021    ALKPHOS 86 11/16/2021    ALT 16 11/16/2021    AST 22 11/16/2021    BILITOT 0.7 11/16/2021    LABALBU 3.9 11/16/2021     No results found for: INR    Radiology:  NM LUNG VENT/PERFUSION (VQ)    Result Date: 11/16/2021  EXAMINATION: NUCLEAR MEDICINE VENTILATION PERFUSION SCAN. 11/16/2021 TECHNIQUE: 9.2 millicuries of TFRGQ-842 was administered via mask prior to planar imaging of the lungs in anterior and posterior projections. Then, 8.1 millicuries of Tc 33O MAA was administered intravenously prior to planar imaging of the lungs in multiple projections. COMPARISON: Chest radiograph 2 hours ago. HISTORY: ORDERING SYSTEM PROVIDED HISTORY: elevated dimer - ro PE TECHNOLOGIST PROVIDED HISTORY: Reason for exam:->elevated dimer - ro PE Reason for Exam: Elevated D-dimer Acuity: Unknown Type of Exam: Ongoing FINDINGS: VENTILATION: Ventilation images are unremarkable. PERFUSION: Perfusion is fairly homogeneous. There are no peripheral wedge-shaped defects. CHEST RADIOGRAPH: Lungs are clear. Enlargement of the cardiac silhouette. Low probability for pulmonary embolus. XR CHEST PORTABLE    Result Date: 11/16/2021  EXAMINATION: ONE XRAY VIEW OF THE CHEST 11/16/2021 1:04 pm COMPARISON: 10/10/2006 HISTORY: ORDERING SYSTEM PROVIDED HISTORY: cp TECHNOLOGIST PROVIDED HISTORY: Reason for exam:->cp Acuity: Unknown FINDINGS: Cardiomegaly. No acute airspace disease. Pulmonary vascular congestion. No pneumothorax. No pleural effusion. Cardiomegaly with pulmonary vascular congestion     The patient was seen and examined on day of discharge and this discharge summary is in conjunction with any daily progress note from day of discharge. Time Spent on discharge is 45 minutes  in the examination, evaluation, counseling and review of medications and discharge plan. Note that more than 30 minutes was spent in preparing discharge papers, discussing discharge with patient, medication review, etc.       Signed:    Ruel Aguirre MD   11/17/2021      Thank you [address] [place] [address], APRN - CNP for the opportunity to be involved in this patient's care.  If you have any questions or concerns please feel free to contact me at [address] [place] [address]

## 2021-11-17 NOTE — PROGRESS NOTES
11/17/21 0106   RT Protocol   History Pulmonary Disease 0   Respiratory pattern 2   Breath sounds 2   Cough 0   Indications for Bronchodilator Therapy Decreased or absent breath sounds   Bronchodilator Assessment Score 4

## 2021-11-17 NOTE — CONSULTS
[address]  H+P // Pretty Isaac // OP VISIT // Muna Dose, APRN - CNP  ENC TYPE FU    CHIEF COMPLAINT/HPI   CC HPI   cp Patrick Marley is a 54 y. o.presents with cp. Cp substernal, nonexertional, self-limited, worse with deep breath and lying on side. Troponin negative X2, EKG without injury changes. Hx of HTN. Of note, patient morbidly obese weighing 520lbs. Hx of tobacco use, current MJ use. HISTORY/ALLLERGY/ROS   MedHx  has a past medical history of Asthma, Depression, Hx of blood clots, and Hypertension. SurgHx  has a past surgical history that includes incomplete circumcision repair. SocHx  reports that he quit smoking about 19 years ago. He quit smokeless tobacco use about 26 years ago. He reports current drug use. Drug: Marijuana Estil Catena). He reports that he does not drink alcohol. FamHx family history is not on file. Allerg Patient has no known allergies.    ROS [x]Full ROS obtained and negative except as mentioned in HPI      MEDICATIONS      Current Facility-Administered Medications   Medication Dose Route Frequency Provider Last Rate Last Admin    albuterol sulfate  (90 Base) MCG/ACT inhaler 2 puff  2 puff Inhalation BID Nic Finely PA-C        albuterol sulfate  (90 Base) MCG/ACT inhaler 1 puff  1 puff Inhalation Q4H PRN Nic Jere PA-C        amLODIPine (NORVASC) tablet 10 mg  10 mg Oral Daily Nic Jere PA-C        lisinopril (PRINIVIL;ZESTRIL) tablet 40 mg  40 mg Oral Daily Julissa Sharma PA-C        budesonide-formoterol (SYMBICORT) 160-4.5 MCG/ACT inhaler 2 puff  2 puff Inhalation BID Nic Jere PA-C        pantoprazole (PROTONIX) tablet 40 mg  40 mg Oral QAM AC Nic HEATH Oquendo        sodium chloride flush 0.9 % injection 10 mL  10 mL IntraVENous 2 times per day Nic Finely, PA-C        sodium chloride flush 0.9 % injection 10 mL  10 mL IntraVENous PRN Nic Jere, PA-C  0.9 % sodium chloride infusion  25 mL IntraVENous PRN Jannine Norfolk, PA-C        acetaminophen (TYLENOL) tablet 650 mg  650 mg Oral Q6H PRN MARCIA Nava-C        Or    acetaminophen (TYLENOL) suppository 650 mg  650 mg Rectal Q6H PRN Janhans Anthony, PA-C        magnesium hydroxide (MILK OF MAGNESIA) 400 MG/5ML suspension 30 mL  30 mL Oral Daily PRN Radha Anthony, PA-C        aspirin chewable tablet 81 mg  81 mg Oral Daily MARCIA Espinosa-C        enoxaparin (LOVENOX) injection 40 mg  40 mg SubCUTAneous Daily TUNG EspinosaC        atorvastatin (LIPITOR) tablet 40 mg  40 mg Oral Nightly Radha Anthony, PA-C        nitroGLYCERIN (NITROSTAT) SL tablet 0.4 mg  0.4 mg SubLINGual Q5 Min PRN Radha Anthony, PA-C        ondansetron [facility]) injection 4 mg  4 mg IntraVENous Q6H PRN Radha Anthony, PA-C        insulin lispro (1 Unit Dial) 0-6 Units  0-6 Units SubCUTAneous TID WC Radha Anthony, PA-C        insulin lispro (1 Unit Dial) 0-3 Units  0-3 Units SubCUTAneous Nightly TUNG EspinosaC        glucose (GLUTOSE) 40 % oral gel 15 g  15 g Oral PRN Radha Anthony, PA-C        dextrose 50 % IV solution  12.5 g IntraVENous PRN Radha Anthony, PA-C        glucagon (rDNA) injection 1 mg  1 mg IntraMUSCular PRN Radha Anthony, PA-C        dextrose 5 % solution  100 mL/hr IntraVENous PRN Jannine Norfolk, PA-C        potassium chloride (KLOR-CON M) extended release tablet 40 mEq  40 mEq Oral PRN Jannine Norfolk, PA-C        Or    potassium bicarb-citric acid (EFFER-K) effervescent tablet 40 mEq  40 mEq Oral PRN Jannine Norfolk, PA-C        Or    potassium chloride 10 mEq/100 mL IVPB (Peripheral Line)  10 mEq IntraVENous PRN Jannine Norfolk, PA-C        influenza quadrivalent split vaccine (FLUZONE;FLUARIX;FLULAVAL;AFLURIA) injection 0.5 mL  0.5 mL IntraMUSCular Prior to discharge Suni Jalloh MD        ketorolac (TORADOL) injection 30 mg  30 mg IntraVENous Q6H PRN Carrie Oleary PA-C   30 mg at 11/17/21 0003     [x]Reviewed with patient and will remain unchanged except as mentioned in A/P    PHYSICAL EXAM   Vitals:    11/17/21 0425   BP: (!) 142/78   Pulse: 80   Resp: 18   Temp: 98 °F (36.7 °C)   SpO2: 95%        Gen Alert, coop, no distress Heart  Rrr, no mrg   Head NC, AT, no abnorm Abd  Soft, NT, +BS, no mass, no OM   Eyes PER, conj/corn clear Ext  Ext nl, AT, no C/C/E   Nose Nares nl, no drain, NT Pulse 2+ and symmetric   Throat Lips, mucosa, tongue nl Skin Col/text/turg nl, no vis rash/les   Neck S/S, TM, NT, no bruit/JVD Psych Nl mood and affect   Lung CTA-B, unlabored, no DTP Lymph   No cervical or axillary LA   Ch wall NT, no deform Neuro  Nl gross M/S exam     ASSESSMENT AND PLAN   *CP  Status Atypical symptoms, Troponin and ekg without suggestion injury  TTE pending  C none  Plan Weight precludes LHC, patient exceeds table limit and visualization would be very limited   Recommend marked weight loss such that standard diagnostic studies could be offered   Given will not pursue stress with [facility], limited role of stress test   Discussed plan and limitations with patient in detail and he understands  *HTN  Status controlled  Plan Continue current meds  *OBESITY  Status Markedly overweight and interfering with diagnostic testing and precludes standard of care  Plan Discussed weight loss, diet, exercise    Call with questions

## 2021-11-17 NOTE — PROGRESS NOTES
Data- discharge order received, pt verbalized agreement to discharge, disposition to previous residence, no needs for HHC/DME. Discharge order received this AM, echo done, results relayed to Dr. Lynette Barreto. After no response from MD, pt demanded to leave and this RN agreed to discharge d/t discharge orders in. Pt asked if his phone number could be relayed to MD for results, Dr. Lynette Barreto notified. Action- discharge instructions prepared and given to patient, pt verbalized understanding. Medication information packet given r/t NEW and/or CHANGED prescriptions emphasizing name/purpose/side effects, pt verbalized understanding. Discharge instruction summary: Diet- regular, Activity- up as tolerated, Primary Care Physician as Fawn Moran, [address] [place] [address] Road [phone] f/u appointment within one week, immunizations reviewed and updated, prescription medications filled in outpt pharmacy. 1. WEIGHT: Admit Weight: (!) 520 lb (235.9 kg) (11/16/21 1253)        Today  Weight: (!) 520 lb (235.9 kg) (11/16/21 1253)       2. O2 SAT.: SpO2: 95 % (11/17/21 0800)    Response- Pt belongings gathered, IV removed. Disposition is home (no HHC/DME needs), transported with self, taken to lobby via w/c w/ RN, no complications.

## 2021-11-17 NOTE — ED NOTES
Bed: 15  Expected date:   Expected time:   Means of arrival:   Comments:  75 Priyank Elizabeth RN  11/16/21 0295

## 2021-11-17 NOTE — ACP (ADVANCE CARE PLANNING)
Advanced Care Planning Note. Purpose of Encounter: Advanced care planning in light of chest pain  Parties In Attendance: Patient  Decisional Capacity: Yes  Subjective: Patient is chest pain  Objective: Cr 0.6  Goals of Care Determination: Patient wants full support (CPR, vent, surgery, HD, trach, PEG)  Plan:  Cardiology consult. Echo  Code Status: Full code   Time spent on Advanced care Plannin minutes  Advanced Care Planning Documents: Completed advanced directives on chart, mother is the POA.     Yonatan Bliss MD  2021 8:00 AM

## 2021-11-17 NOTE — PROGRESS NOTES
CLINICAL PHARMACY NOTE: MEDS TO BEDS    Total # of Prescriptions Filled: 2   The following medications were delivered to the patient:  · Ibuprofen 600mg  · Cyclobenazaprine 5mg    Additional Documentation:    Medications were delivered to patient's room and patient signed for    Severa Cabal CPhT

## 2021-11-17 NOTE — H&P
Hospital Medicine History & Physical      Patient Name: Evaristo Kovacs    : 1970    PCP: DYLON Chavira CNP    Date of Service:  Patient seen and examined on 2021     Chief Complaint:  CP, SOB    History Of Present Illness:    Evaristo Kovacs is a 54 y.o. male who presented to ED for evaluation of chest pain. He reports he woke up 2-3 days ago with substernal chest pain which felt like a pulled muscle. He reports pain subsided on its own. This morning he woke up again with the same substernal chest pain but also had right sided chest pain which was worsened by inspiration. He reports associated shortness of breath and feels like he can't take a deep breath. He reports associated nausea. He denies fever, dizziness, cough, edema, abdominal pain, vomiting, diarrhea, constipation, urinary symptoms. 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 He is not on any anticoagulation. He denies any recent travel, surgery, or immobilization. He denies any known sick contacts. He is a current every day smoker. Past Medical History:    Patient has a past medical history of Asthma, Depression, Hx of blood clots, and Hypertension. Past Surgical History:    Patient has a past surgical history that includes incomplete circumcision repair. Medications Prior to Admission:      Prior to Admission medications    Medication Sig Start Date End Date Taking?  Authorizing Provider   lisinopril (PRINIVIL;ZESTRIL) 40 MG tablet Take 1 tablet by mouth daily 21   DYLON Che CNP   albuterol sulfate HFA (VENTOLIN HFA) 108 (90 Base) MCG/ACT inhaler Inhale 1 puff into the lungs every 4 hours as needed for Wheezing 21   DYLON Che CNP   omeprazole (PRILOSEC) 20 MG delayed release capsule Take 1 capsule by mouth Daily 8/10/21   DYLON Che CNP   amLODIPine (NORVASC) 10 MG tablet Take 1 tablet by mouth daily 8/10/21   DYLON Che CNP mometasone-formoterol (DULERA) 200-5 MCG/ACT inhaler Inhale 2 puffs into the lungs 2 times daily 8/10/21   DYLON London CNP       Allergies:  Patient has no known allergies. Social History:      TOBACCO:   reports that he quit smoking about 19 years ago. He quit smokeless tobacco use about 26 years ago. ETOH:   reports no history of alcohol use. DRUGS:  reports current drug use. Drug: Marijuana Joyce Eglin). Family History:      Reviewed in detail positive as follows:    History reviewed. No pertinent family history. REVIEW OF SYSTEMS:   Pertinent positives as noted in the HPI. All other systems reviewed and negative. PHYSICAL EXAM PERFORMED:    BP (!) 177/123   Pulse 86   Temp 98.1 °F (36.7 °C)   Resp 20   Ht 6' 4\" (1.93 m)   Wt (!) 520 lb (235.9 kg)   SpO2 92%   BMI 63.30 kg/m²     General appearance:  Awake, alert, no apparent distress  HEENT:  Normocephalic, atraumatic without obvious deformity. PERRL. EOM intact. Conjunctivae/corneas clear. Neck: Supple, with full range of motion. No JVD. Trachea midline. Respiratory:  Clear to auscultation bilaterally without rales, wheezes, or rhonchi. Normal respiratory effort. Cardiovascular:  Regular rate and rhythm without murmurs, rubs or gallops. Abdomen: Soft, NT, ND, without rebound or guarding. Normal bowel sounds. Extremities:  No clubbing, cyanosis, or edema bilaterally. Full range of motion without deformity. +2 palpable pulses, equal bilaterally. Capillary refill brisk,< 3 seconds   Skin: No rashes or lesions. Warm/dry. Neurologic:  Neurovascularly intact without any focal sensory/motor deficits. Cranial nerves: II-XII intact, grossly non-focal. Alert and oriented x 3. Normal speech. Psychiatric:  Thought content appropriate, normal insight.     Labs:   CBC   Recent Labs     11/16/21  1320   WBC 8.9   HGB 15.1   HCT 43.5           RENAL  Recent Labs     11/16/21  1320      K 3.4*      CO2 23   BUN 15 CREATININE 0.8*       LFTS  Recent Labs     11/16/21  1320   AST 22   ALT 16   BILITOT 0.7   ALKPHOS 86       COAG  No results for input(s): INR in the last 72 hours. CARDIAC ENZYMES  Recent Labs     11/16/21  1320   TROPONINI <0.01       LIPIDS  No results found for: CHOL, TRIG, HDL, LDLCALC      Radiology:     NM LUNG VENT/PERFUSION (VQ)   Final Result   Low probability for pulmonary embolus. XR CHEST PORTABLE   Final Result   Cardiomegaly with pulmonary vascular congestion             EKG:   Read by ED physician in the absence of a cardiologist:  \"normal sinus rhythm with a rate of 68  Axis is   Normal  QTc is  normal  Intervals and Durations are unremarkable. No specific ST-T wave changes appreciated. No evidence of acute ischemia. Compared to prior February 22, 2011, PVC burden has increased. \"      ASSESSMENT/PLAN:    Chest pain/SOB  EKG shows nonspecific ST/T abnormalities  Initial troponin negative, will trend x 2 more draws  CXR notable for cardiomegaly with pulmonary vascular congestion  CTPA unable to be obtained due to patient's size. VQ scan shows low probability of PE  ASA, statin  NTG for chest pain PRN  Stress test and ECHO ordered   Check lipid panel  Cardiology consulted    Morbid obesity due to excess calories (Body mass index is 98.9 kg/m².)   Complicating assessment and treatment. Obesity places the patient at risk for multiple co-morbidities as well as early death and may be contributing to the patient's presentation. Supportive care. Encourage therapeutic lifestyle changes. Hyperglycemia  Denies hx of DM.  Last HbA1c 5.1 in 5/2019  Accuchecks, SSI  Hypoglycemia protocol    Check HbA1c    Hypokalemia  Replace PRN and recheck    HTN  Continue home lisinopril, norvasc    Asthma without acute exacerbation  Continue home dulera and albuterol        DVT prophylaxis: Lovenox  Probiotic if on abx: N/A    Diet: Diet NPO Exceptions are: Sips of Water with Meds  Code Status: Full Code    Consults:  IP CONSULT TO CARDIOLOGY    Disposition: Place in observation  ELOS: Less than two midnights    Cade Quiroz PA-C    Thank you DYLON Klein CNP for the opportunity to be involved in this patient's care. If you have any questions or concerns please feel free to contact me at [phone].

## 2021-11-17 NOTE — PROGRESS NOTES
11/16/21 2345   Patient Observation   Observations pt.  refused symbicort, states he will take in the am

## 2021-11-17 NOTE — ED PROVIDER NOTES
This patient was seen by the Mid-Level Provider. I have seen and examined the patient, agree with the workup, evaluation, management and diagnosis. Care plan has been discussed. My assessment reveals a 70-year-old male who presents with chest pain. This is a 70-year-old male who presents with chest pain that he has had for the last several days. The pain comes and goes. He thinks he may have stretched his pec muscle while reaching backwards on the computer. He describes some shortness of breath and dizziness with it. He describes as a 7 on a 1-10 scale with no radiation. It is aching in nature. Patient has a history of hypertension and he does not know his cholesterol status. There is no history of family cardiac disease noted. The patient does smoke marijuana occasionally. Radiology results:    NM LUNG VENT/PERFUSION (VQ)   Final Result   Low probability for pulmonary embolus.          XR CHEST PORTABLE   Final Result   Cardiomegaly with pulmonary vascular congestion               LABS:    Labs Reviewed   CBC WITH AUTO DIFFERENTIAL - Abnormal; Notable for the following components:       Result Value    Eosinophils Absolute 0.7 (*)     All other components within normal limits    Narrative:     Performed at:  [facility]  [address] [facility], [address]   Phone [phone]   COMPREHENSIVE METABOLIC PANEL W/ REFLEX TO MG FOR LOW K - Abnormal; Notable for the following components:    Potassium reflex Magnesium 3.4 (*)     Glucose 165 (*)     CREATININE 0.8 (*)     All other components within normal limits    Narrative:     Performed at:  [facility]  [address] [facility], [address]   Phone [phone]   BRAIN NATRIURETIC PEPTIDE - Abnormal; Notable for the following components:    Pro- (*)     All other components within normal limits    Narrative:     Performed at:  [facility]  [address] [facility],  [place], [facility] [place]   Phone [phone]   D-DIMER, QUANTITATIVE - Abnormal; Notable for the following components:    D-Dimer, Quant 464 (*)     All other components within normal limits    Narrative:     Performed at:  [facility] [address],  [place], [address] [employer]   Phone [phone]   URINE RT REFLEX TO CULTURE - Abnormal; Notable for the following components:    Bilirubin Urine SMALL (*)     Ketones, Urine TRACE (*)     Protein, UA TRACE (*)     All other components within normal limits    Narrative:     Performed at:  [facility] [address]  [place], [address]   Phone [phone]   LIPASE    Narrative:     Performed at:  [facility] [address]  [place], [address]   Phone [phone]   TROPONIN    Narrative:     Performed at:  [facility] [address]  [place], [facility] [place]   Phone [phone]   MAGNESIUM    Narrative:     Performed at:  [facility] [address]  [place], [address] [place]   Phone [phone]   MICROSCOPIC URINALYSIS    Narrative:     Performed at:  [facility] [address] [employer], [employer]   Phone [phone]           EKG:    Sinus rhythm at a rate of 68 beats a minute with no acute ST elevations or depressions or pathologic Q waves. Occasional PVC. Exam:    Well-nourished male, obese but no acute distress. There is no reproducible of the pain on palpation. It was not reproducible when he would stretch his arms. Medical decision makin-year-old male presents with chest pain. Patient has some significant risk factors and is morbidly obese. We did do a VQ scan of the patient because of his body habitus that was low probability for pulmonary embolism.  Given the patient's risk, he will be admitted for further care and serial

## 2021-11-19 ENCOUNTER — TELEPHONE (OUTPATIENT)
Dept: FAMILY MEDICINE CLINIC | Age: 51
End: 2021-11-19

## 2021-11-19 NOTE — TELEPHONE ENCOUNTER
[facility] Transitions Initial Follow Up Call    Outreach made within 2 business days of discharge: Yes    Patient: Dorothy Hurt Patient : 1970   MRN: [id number]  Reason for Admission: There are no discharge diagnoses documented for the most recent discharge.   Discharge Date: 21         [facility] [place]

## 2021-11-19 NOTE — LETTER
[address]  Phone: [phone]  Fax: [phone]    DYLON Tucker CNP        November 30, 2021      Dear Karla Patino,                          We have been trying to reach you by phone in regards to your hospital visit. Please give the office a call as soon as possible at [phone] to schedule a follow up visit with me.        Sincerely,        DYLON Tucker CNP

## 2021-11-19 NOTE — LETTER
[address]  Phone: [phone]  Fax: [phone]    DYLON Park CNP        November 30, 2021      Dear Mr. Craft:  [facility]:[phone]    We were unable to reach you by phone. Please call our office at your earliest convenience. Please have your medical record number (listed above) available when you call. This message is regarding:Hospital Discharge follow up. Please call between the hours of 8:30am and 4:00pm Monday through Friday if possible.       Sincerely,        DYLON Park CNP

## 2021-11-24 NOTE — TELEPHONE ENCOUNTER
[facility] Transitions Initial Follow Up Call    Outreach made within 2 business days of discharge: Yes    Patient: Linda Shen Patient : 1970   MRN: [id number]  Reason for Admission: There are no discharge diagnoses documented for the most recent discharge. Discharge Date: 21       Spoke with: Derek Perez     Discharge department/facility: [facility] Interactive Patient Contact:  Was patient able to fill all prescriptions: Yes  Was patient instructed to bring all medications to the follow-up visit: Yes  Is patient taking all medications as directed in the discharge summary? Yes  Does patient understand their discharge instructions: Yes  Does patient have questions or concerns that need addressed prior to 7-14 day follow up office visit: yes - Patient wants to know the results from the test Dr. Cara Duran on 2021 please advise    Scheduled appointment with PCP within 7-14 days    Follow Up  No future appointments.     [place], [place]

## 2021-12-03 ENCOUNTER — OFFICE VISIT (OUTPATIENT)
Dept: FAMILY MEDICINE CLINIC | Age: 51
End: 2021-12-03
Payer: MEDICAID

## 2021-12-03 VITALS
HEART RATE: 86 BPM | HEIGHT: 77 IN | SYSTOLIC BLOOD PRESSURE: 130 MMHG | OXYGEN SATURATION: 98 % | BODY MASS INDEX: 61.66 KG/M2 | DIASTOLIC BLOOD PRESSURE: 80 MMHG

## 2021-12-03 DIAGNOSIS — I42.0 DILATED CARDIOMYOPATHY (HCC): ICD-10-CM

## 2021-12-03 DIAGNOSIS — Z12.11 SCREENING FOR COLON CANCER: Primary | ICD-10-CM

## 2021-12-03 DIAGNOSIS — I10 ESSENTIAL HYPERTENSION: ICD-10-CM

## 2021-12-03 DIAGNOSIS — E66.01 CLASS 3 SEVERE OBESITY DUE TO EXCESS CALORIES WITH SERIOUS COMORBIDITY AND BODY MASS INDEX (BMI) OF 60.0 TO 69.9 IN ADULT (HCC): ICD-10-CM

## 2021-12-03 PROBLEM — E66.813 CLASS 3 SEVERE OBESITY DUE TO EXCESS CALORIES WITH SERIOUS COMORBIDITY AND BODY MASS INDEX (BMI) OF 60.0 TO 69.9 IN ADULT: Status: ACTIVE | Noted: 2021-11-17

## 2021-12-03 PROCEDURE — 99214 OFFICE O/P EST MOD 30 MIN: CPT | Performed by: NURSE PRACTITIONER

## 2021-12-03 PROCEDURE — 1111F DSCHRG MED/CURRENT MED MERGE: CPT | Performed by: NURSE PRACTITIONER

## 2021-12-03 RX ORDER — METOPROLOL SUCCINATE 25 MG/1
25 TABLET, EXTENDED RELEASE ORAL DAILY
Qty: 30 TABLET | Refills: 3 | Status: SHIPPED | OUTPATIENT
Start: 2021-12-03 | End: 2022-05-16 | Stop reason: SDUPTHER

## 2021-12-03 ASSESSMENT — ENCOUNTER SYMPTOMS
EYES NEGATIVE: 1
BLOOD IN STOOL: 0
NAUSEA: 0
ABDOMINAL PAIN: 0
WHEEZING: 0
SINUS PRESSURE: 0
CHEST TIGHTNESS: 0
CONSTIPATION: 0
SINUS PAIN: 0
COUGH: 0
SHORTNESS OF BREATH: 0
VOMITING: 0
DIARRHEA: 0
SORE THROAT: 0

## 2021-12-03 NOTE — PROGRESS NOTES
Post-Discharge Transitional Care Management Services or Hospital Follow Up    Christopher Bhatt   YOB: 1970    Date of Office Visit:  12/3/2021  Date of Hospital Admission: 11/16/21  Date of Hospital Discharge: 11/17/21  Readmission Risk Score(high >=14%.  Medium >=10%):No data recorded    Care management risk score Rising risk (score 2-5) and Complex Care (Scores >=6): 1     Non face to face  following discharge, date last encounter closed (first attempt may have been earlier): 11/24/2021  3:05 PM 11/24/2021  3:05 PM    Call initiated 2 business days of discharge: Yes     Patient Active Problem List   Diagnosis    Asthma    Depression    Hx of blood clots    Chest pain    Morbid obesity with BMI of 60.0-69.9, adult ([facility]    HTN (hypertension)    Hypokalemia    Hyperglycemia    Cardiomyopathy ([facility]       No Known Allergies    Medications listed as ordered at the time of discharge from hospital  @DISCHARGEMEDSLIST(<NOROUTINE> error)@      Medications marked \"taking\" at this time  Outpatient Medications Marked as Taking for the 12/3/21 encounter (Office Visit) with DYLON Colon CNP   Medication Sig Dispense Refill    ibuprofen (ADVIL;MOTRIN) 600 MG tablet Take 1 tablet by mouth 4 times daily as needed for Pain 40 tablet 0    lisinopril (PRINIVIL;ZESTRIL) 40 MG tablet Take 1 tablet by mouth daily 90 tablet 1    albuterol sulfate HFA (VENTOLIN HFA) 108 (90 Base) MCG/ACT inhaler Inhale 1 puff into the lungs every 4 hours as needed for Wheezing 1 Inhaler 3    omeprazole (PRILOSEC) 20 MG delayed release capsule Take 1 capsule by mouth Daily 90 capsule 1    amLODIPine (NORVASC) 10 MG tablet Take 1 tablet by mouth daily 90 tablet 1    mometasone-formoterol (DULERA) 200-5 MCG/ACT inhaler Inhale 2 puffs into the lungs 2 times daily 1 Inhaler 3        Medications patient taking as of now reconciled against medications ordered at time of hospital discharge: Yes    Chief Complaint Patient presents with    Follow-Up from Hospital     chest tightness, x11/16-11/17    Other     would like test results from test at hospital       HPI   Patient was admitted to the hospital for chest pain rule out. He underwent testing and all was negative. He is feeling much better now. Inpatient course: Discharge summary reviewed- see chart. Interval history/Current status: patient states his pain was likely related to a muscle issue. His pain was resolved with a muscle relaxant. He has had no symptoms since he was discharged from hospital.    Review of Systems   Constitutional: Negative for chills, diaphoresis, fatigue and fever. HENT: Negative for congestion, ear discharge, ear pain, sinus pressure, sinus pain and sore throat. Eyes: Negative. Respiratory: Negative for cough, chest tightness, shortness of breath and wheezing. Cardiovascular: Negative for chest pain, palpitations and leg swelling. Gastrointestinal: Negative for abdominal pain, blood in stool, constipation, diarrhea, nausea and vomiting. Endocrine: Negative. Genitourinary: Negative. Musculoskeletal: Negative. Skin: Negative. Neurological: Negative for dizziness, weakness and headaches. Psychiatric/Behavioral: Negative. Vitals:    12/03/21 1024   BP: 130/80   Pulse: 86   SpO2: 98%   Height: 6' 5\" (1.956 m)     Body mass index is 61.66 kg/m². Wt Readings from Last 3 Encounters:   11/16/21 (!) 520 lb (235.9 kg)   08/10/21 (!) 500 lb (226.8 kg)   07/09/19 (!) 465 lb (210.9 kg)     BP Readings from Last 3 Encounters:   12/03/21 130/80   11/17/21 136/80   08/10/21 (!) 142/92       Physical Exam  Constitutional:       General: He is not in acute distress. Appearance: Normal appearance. He is normal weight. He is not ill-appearing. Cardiovascular:      Rate and Rhythm: Normal rate and regular rhythm. Heart sounds: Normal heart sounds, S1 normal and S2 normal. No murmur heard.       Pulmonary: Effort: Pulmonary effort is normal.      Breath sounds: Normal breath sounds and air entry. Skin:     General: Skin is warm and dry. Capillary Refill: Capillary refill takes less than 2 seconds. Neurological:      General: No focal deficit present. Mental Status: He is alert. Psychiatric:         Mood and Affect: Mood normal.             Assessment/Plan:  1. Essential hypertension  Stable  Changed med due to recent echo results and EF at 40%  - metoprolol succinate (TOPROL XL) 25 MG extended release tablet; Take 1 tablet by mouth daily  Dispense: 30 tablet; Refill: 3    2. Class 3 severe obesity due to excess calories with serious comorbidity and body mass index (BMI) of 60.0 to 69.9 in adult [facility])  Today we had a long discussion regarding his weight and the pressure this is putting on his heart. He agrees that he needs to do something.    - Mar Dorantes MD, Bariatric Surgery, [facility]    3. Dilated cardiomyopathy ([facility]  - Julian Beebe MD, Cardiology, [facility]    4.  Screening for colon cancer  - [employer] (Cologuard)  - [place] DISCHARGE MEDS RECONCILED W/ CURRENT OUTPATIENT MED LIST    Medical Decision Making: moderate complexity

## 2021-12-03 NOTE — PATIENT INSTRUCTIONS
[address] Kenneth Shelton MD  [address] , [address], [address]  [phone]      Patient Education        Dilated Cardiomyopathy: Care Instructions  Your Care Instructions     Dilated cardiomyopathy is a condition that weakens your heart muscle and causes it to stretch, or dilate. When your heart muscle is weak, it can't pump out blood as well as it should. More blood stays in your heart after each heartbeat. As more blood fills and stays in the heart, the heart muscle stretches even more and gets even weaker. Many things can cause dilated cardiomyopathy. It can be caused by another disease or condition. Some people have a family history of dilated cardiomyopathy. For some people, the cause is not known. You may not have any symptoms at first. Or you may have symptoms, such as feeling very tired or weak. If your heart gets weaker, you may develop heart failure. Heart failure means that your heart muscle doesn't pump as much blood as your body needs. If this happens, you will feel other symptoms such as shortness of breath or trouble breathing when you lie down. The goal of treatment is to slow the disease and help you feel better. You may also have treatment for the cause of the cardiomyopathy. You will probably take a few medicines. If your doctor thinks it will help your heart and prevent problems, you may get a device such as a pacemaker. Self-care is another important part of your treatment. It includes the things you can do every day to feel better and stay as healthy as possible. Follow-up care is a key part of your treatment and safety. Be sure to make and go to all appointments, and call your doctor if you are having problems. It's also a good idea to know your test results and keep a list of the medicines you take. How can you care for yourself at home? Medicines    · Be safe with medicines. Take your medicines exactly as prescribed.  Call your doctor if you think you are having a problem with your medicine. You may be taking some of the following medicines:  ? Angiotensin-converting enzyme (ACE) inhibitors or angiotensin II receptor blockers (ARBs). These make it easier for blood to flow. ? Diuretics. These help remove excess fluid from the body. ? Beta-blockers. These slow the heart rate and can help the heart fill with blood more completely. Heart-healthy lifestyle    · Be active. Exercise regularly, but don't exercise too hard. If you aren't already active, your doctor may want you to start exercising. But don't start until you have talked with your doctor to make an exercise program that is safe for you.     · Do not smoke. Smoking can make a heart condition worse. If you need help quitting, talk to your doctor about stop-smoking programs and medicines. These can increase your chances of quitting for good.     · Eat a heart-healthy diet.     · Stay at a healthy weight. Lose weight if you need to.     · If your doctor recommends it, limit sodium. This helps keep fluid from building up in your body. It may help you feel better.     · Manage other health problems. These include diabetes, high blood pressure, and high cholesterol. If you think you may have a problem with alcohol or drug use, talk to your doctor. Weight monitoring    · Weigh yourself without clothing at the same time each day. Record your weight. Call your doctor if you have a sudden weight gain, such as more than 2 to 3 pounds in a day or 5 pounds in a week. (Your doctor may suggest a different range of weight gain.) A sudden weight gain may mean that your condition is getting worse. When should you call for help? Call 911 anytime you think you may need emergency care. For example, call if:    · You have symptoms of sudden heart failure. These may include:  ? Severe trouble breathing. ? A fast or irregular heartbeat. ? Coughing up pink, foamy mucus. ? Passing out.    Call your doctor now or seek immediate medical care if:    · You have new or changed symptoms of heart failure, such as:  ? New or increased shortness of breath. ? New or worse swelling in your legs, ankles, or feet. ? Sudden weight gain, such as more than 2 to 3 pounds in a day or 5 pounds in a week. (Your doctor may suggest a different range of weight gain.)  ? Feeling dizzy or lightheaded or like you may faint. ? Feeling so tired or weak that you cannot do your usual activities. ? Not sleeping well. Shortness of breath wakes you at night. You need extra pillows to prop yourself up to breathe easier. Watch closely for changes in your health, and be sure to contact your doctor if you have any problems. Where can you learn more? Go to https://[employer] Internationalosmaneb.Rotten Tomatoes. org and sign in to your [employer] account. Enter B164 in the [employer] box to learn more about \"Dilated Cardiomyopathy: Care Instructions. \"     If you do not have an account, please click on the \"Sign Up Now\" link. Current as of: April 29, 2021               Content Version: 13.0  © 2006-2021 [employer]. Care instructions adapted under license by [facility] ([facility]). If you have questions about a medical condition or this instruction, always ask your healthcare professional. [place] [address] any warranty or liability for your use of this information. Patient Education        Heart Failure: Care Instructions  Your Care Instructions     Heart failure occurs when your heart does not pump as much blood as the body needs. Failure does not mean that the heart has stopped pumping but rather that it is not pumping as well as it should. Over time, this causes fluid buildup in your lungs and other parts of your body. Fluid buildup can cause shortness of breath, fatigue, swollen ankles, and other problems.  By taking medicines regularly, reducing sodium (salt) in your diet, checking your weight every day, and making lifestyle changes, you can feel better and live longer. Follow-up care is a key part of your treatment and safety. Be sure to make and go to all appointments, and call your doctor if you are having problems. It's also a good idea to know your test results and keep a list of the medicines you take. How can you care for yourself at home? Medicines    · Be safe with medicines. Take your medicines exactly as prescribed. Call your doctor if you think you are having a problem with your medicine.     · Do not take any vitamins, over-the-counter medicine, or herbal products without talking to your doctor first. Liliya Mates not take ibuprofen (Advil or Motrin) and naproxen (Aleve) without talking to your doctor first. They could make your heart failure worse.     · You may take some of the following medicine. ? Angiotensin-converting enzyme inhibitors (ACEIs) or angiotensin II receptor blockers (ARBs) reduce the heart's workload, lower blood pressure, and reduce swelling. Taking an ACEI or ARB may lower your chance of needing to be hospitalized. ? Beta-blockers can slow heart rate, decrease blood pressure, and improve your condition. Taking a beta-blocker may lower your chance of needing to be hospitalized. ? Diuretics, also called water pills, reduce swelling. You will get more details on the specific medicines your doctor prescribes. Diet    · Your doctor may suggest that you limit sodium. Your doctor can tell you how much sodium is right for you. An example is less than 3,000 mg a day. This includes all the salt you eat in cooking or in packaged foods. People get most of their sodium from processed foods. Fast food and restaurant meals also tend to be very high in sodium.     · Ask your doctor how much liquid you can drink each day. You may have to limit liquids. Weight    · Weigh yourself without clothing at the same time each day. Record your weight.  Call your doctor if you have a sudden weight gain, such as more than 2 to 3 pounds in a day or 5 pounds in a week. (Your doctor may suggest a different range of weight gain.) A sudden weight gain may mean that your heart failure is getting worse. Activity level    · Start light exercise (if your doctor says it is okay). Even if you can only do a small amount, exercise will help you get stronger, have more energy, and manage your weight and your stress. Walking is an easy way to get exercise. Start out by walking a little more than you did before. Bit by bit, increase the amount you walk.     · When you exercise, watch for signs that your heart is working too hard. You are pushing yourself too hard if you cannot talk while you are exercising. If you become short of breath or dizzy or have chest pain, stop, sit down, and rest.     · If you feel \"wiped out\" the day after you exercise, walk slower or for a shorter distance until you can work up to a better pace.     · Get enough rest at night. Sleeping with 1 or 2 pillows under your upper body and head may help you breathe easier. Lifestyle changes    · Do not smoke. Smoking can make a heart condition worse. If you need help quitting, talk to your doctor about stop-smoking programs and medicines. These can increase your chances of quitting for good. Quitting smoking may be the most important step you can take to protect your heart.     · Limit alcohol to 2 drinks a day for men and 1 drink a day for women. Too much alcohol can cause health problems.     · Avoid getting sick from colds and the flu. Get a pneumococcal vaccine shot. If you have had one before, ask your doctor whether you need another dose. Get a flu shot each year. If you must be around people with colds or the flu, wash your hands often. When should you call for help?    Call 911  if you have symptoms of sudden heart failure such as:    · You have severe trouble breathing.     · You cough up pink, foamy mucus.     · You have a new irregular or rapid heartbeat. Call your doctor now or seek immediate medical care if:    · You have new or increased shortness of breath.     · You are dizzy or lightheaded, or you feel like you may faint.     · You have sudden weight gain, such as more than 2 to 3 pounds in a day or 5 pounds in a week. (Your doctor may suggest a different range of weight gain.)     · You have increased swelling in your legs, ankles, or feet.     · You are suddenly so tired or weak that you cannot do your usual activities. Watch closely for changes in your health, and be sure to contact your doctor if you develop new symptoms. Where can you learn more? Go to https://[employer].Celles. org and sign in to your [employer] account. Enter Y386 in the [employer] box to learn more about \"Heart Failure: Care Instructions. \"     If you do not have an account, please click on the \"Sign Up Now\" link. Current as of: April 29, 2021               Content Version: 13.0  © 2006-2021 Healthwise, Incorporated. Care instructions adapted under license by [facility] ([facility]). If you have questions about a medical condition or this instruction, always ask your healthcare professional. [place] [address] any warranty or liability for your use of this information.

## 2022-02-11 DIAGNOSIS — K21.9 GASTROESOPHAGEAL REFLUX DISEASE WITHOUT ESOPHAGITIS: ICD-10-CM

## 2022-02-11 RX ORDER — OMEPRAZOLE 20 MG/1
CAPSULE, DELAYED RELEASE ORAL
Qty: 90 CAPSULE | Refills: 0 | Status: SHIPPED | OUTPATIENT
Start: 2022-02-11 | End: 2022-05-16 | Stop reason: SDUPTHER

## 2022-02-11 NOTE — TELEPHONE ENCOUNTER
Medication:   Requested Prescriptions     Pending Prescriptions Disp Refills    omeprazole (PRILOSEC) 20 MG delayed release capsule [Pharmacy Med Name: Omeprazole 20 MG Oral Capsule Delayed Release] 90 capsule 0     Sig: Take 1 capsule by mouth once daily        Last Filled:  8/10/21 with 1 refill    Patient Phone Number: [phone] (home)     Last appt: 12/3/2021   Next appt: Visit date not found    Last OARRS: No flowsheet data found.

## 2022-03-28 DIAGNOSIS — J45.909 MODERATE ASTHMA, UNSPECIFIED WHETHER COMPLICATED, UNSPECIFIED WHETHER PERSISTENT: ICD-10-CM

## 2022-03-28 RX ORDER — ALBUTEROL SULFATE 90 UG/1
AEROSOL, METERED RESPIRATORY (INHALATION)
Qty: 18 G | Refills: 0 | Status: SHIPPED | OUTPATIENT
Start: 2022-03-28

## 2022-03-28 NOTE — TELEPHONE ENCOUNTER
Medication:   Requested Prescriptions     Pending Prescriptions Disp Refills    albuterol sulfate  (90 Base) MCG/ACT inhaler [Pharmacy Med Name: Albuterol Sulfate  (90 Base) MCG/ACT Inhalation Aerosol Solution] 18 g 0     Sig: INHALE 1 PUFF BY MOUTH EVERY 4 HOURS AS NEEDED FOR WHEEZING        Last Filled:  8/11/21 with 3 refills    Patient Phone Number: [phone] (home)     Last appt: 12/3/2021   Next appt: Visit date not found    Last OARRS: No flowsheet data found.

## 2022-03-29 ENCOUNTER — TELEPHONE (OUTPATIENT)
Dept: ADMINISTRATIVE | Age: 52
End: 2022-03-29

## 2022-03-29 DIAGNOSIS — J45.909 MODERATE ASTHMA, UNSPECIFIED WHETHER COMPLICATED, UNSPECIFIED WHETHER PERSISTENT: Primary | ICD-10-CM

## 2022-03-29 NOTE — TELEPHONE ENCOUNTER
This is a MEDICAID PLAN. They required use of PREFERRED PRODUCT when available; NO PA REQUIRED for PREFERRED. Please notify pharmacy they have to use a PREFERRED PRODUCT  (Ventolin HFA or ProAir) since PANKAJ is not on the RX.

## 2022-03-30 RX ORDER — ALBUTEROL SULFATE 90 UG/1
1 AEROSOL, METERED RESPIRATORY (INHALATION) EVERY 4 HOURS PRN
Qty: 1 EACH | Refills: 1 | OUTPATIENT
Start: 2022-03-30 | End: 2022-03-30 | Stop reason: SDUPTHER

## 2022-03-30 RX ORDER — ALBUTEROL SULFATE 90 UG/1
2 AEROSOL, METERED RESPIRATORY (INHALATION) EVERY 6 HOURS PRN
Qty: 18 G | Refills: 3 | Status: CANCELLED | OUTPATIENT
Start: 2022-03-30

## 2022-03-30 NOTE — TELEPHONE ENCOUNTER
Called and spoke with patient to see if he still wanted the Rx sent to the [facility] because they are closing.   Patient stated that he wanted it to be sent to the [facility] on [place] please send to that pharmacy send to that pharmacy for patient

## 2022-03-30 NOTE — TELEPHONE ENCOUNTER
Caleb up prescription again. This 1 would not electronically transfer. You will probably have to start a new encounter else the nonelectronic transfer will continue.

## 2022-03-31 RX ORDER — ALBUTEROL SULFATE 90 UG/1
1 AEROSOL, METERED RESPIRATORY (INHALATION) EVERY 4 HOURS PRN
Qty: 1 EACH | Refills: 1 | Status: SHIPPED | OUTPATIENT
Start: 2022-03-31 | End: 2022-05-16 | Stop reason: SDUPTHER

## 2022-05-16 DIAGNOSIS — J45.909 MODERATE ASTHMA, UNSPECIFIED WHETHER COMPLICATED, UNSPECIFIED WHETHER PERSISTENT: ICD-10-CM

## 2022-05-16 DIAGNOSIS — K21.9 GASTROESOPHAGEAL REFLUX DISEASE WITHOUT ESOPHAGITIS: ICD-10-CM

## 2022-05-16 DIAGNOSIS — I10 ESSENTIAL HYPERTENSION: ICD-10-CM

## 2022-05-16 RX ORDER — OMEPRAZOLE 20 MG/1
CAPSULE, DELAYED RELEASE ORAL
Qty: 90 CAPSULE | Refills: 0 | Status: SHIPPED | OUTPATIENT
Start: 2022-05-16 | End: 2022-08-15

## 2022-05-16 RX ORDER — AMLODIPINE BESYLATE 10 MG/1
10 TABLET ORAL DAILY
Qty: 90 TABLET | Refills: 1 | Status: SHIPPED | OUTPATIENT
Start: 2022-05-16

## 2022-05-16 RX ORDER — ALBUTEROL SULFATE 90 UG/1
1 AEROSOL, METERED RESPIRATORY (INHALATION) EVERY 4 HOURS PRN
Qty: 1 EACH | Refills: 1 | Status: SHIPPED | OUTPATIENT
Start: 2022-05-16 | End: 2022-08-23

## 2022-05-16 RX ORDER — METOPROLOL SUCCINATE 25 MG/1
25 TABLET, EXTENDED RELEASE ORAL DAILY
Qty: 30 TABLET | Refills: 3 | Status: SHIPPED | OUTPATIENT
Start: 2022-05-16

## 2022-05-16 NOTE — TELEPHONE ENCOUNTER
Medication and Quantity requested: albuterol sulfate HFA (VENTOLIN HFA) 108 (90 Base) MCG/ACT inhaler     omeprazole (PRILOSEC) 20 MG delayed release capsule    amLODIPine (NORVASC) 10 MG tablet    mometasone-formoterol (DULERA) 200-5 MCG/ACT inhaler    metoprolol succinate (TOPROL XL) 25 MG extended release tablet        Last Visit  12/3/2021    Pharmacy and phone number updated in [facility]:  Yes    [id number]

## 2022-05-16 NOTE — TELEPHONE ENCOUNTER
Medication:   Requested Prescriptions     Pending Prescriptions Disp Refills    albuterol sulfate HFA (VENTOLIN HFA) 108 (90 Base) MCG/ACT inhaler 1 each 1     Sig: Inhale 1 puff into the lungs every 4 hours as needed for Wheezing    omeprazole (PRILOSEC) 20 MG delayed release capsule 90 capsule 0     Sig: Take 1 capsule by mouth once daily    amLODIPine (NORVASC) 10 MG tablet 90 tablet 1     Sig: Take 1 tablet by mouth daily    mometasone-formoterol (DULERA) 200-5 MCG/ACT inhaler 1 each 3     Sig: Inhale 2 puffs into the lungs 2 times daily    metoprolol succinate (TOPROL XL) 25 MG extended release tablet 30 tablet 3     Sig: Take 1 tablet by mouth daily       Last Filled:  3/31/2022, 1, 1  2/11/2022, 90, 0  8/10/2021, 90, 1  8/10/2021, 1, 3  12/3/2021, 30, 3    Patient Phone Number: [phone] (home)     Last appt: 12/3/2021   Next appt: Visit date not found    Lab Results   Component Value Date     11/17/2021    K 3.4 (L) 11/17/2021     11/17/2021    CO2 26 11/17/2021    BUN 11 11/17/2021    CREATININE 0.6 (L) 11/17/2021    GLUCOSE 111 (H) 11/17/2021    CALCIUM 8.8 11/17/2021    PROT 6.8 11/16/2021    LABALBU 3.9 11/16/2021    BILITOT 0.7 11/16/2021    ALKPHOS 86 11/16/2021    AST 22 11/16/2021    ALT 16 11/16/2021    LABGLOM >60 11/17/2021    GFRAA >60 11/17/2021    AGRATIO 1.3 11/16/2021     Last Labs DM:   Lab Results   Component Value Date    LABA1C 4.8 11/17/2021

## 2022-08-12 DIAGNOSIS — K21.9 GASTROESOPHAGEAL REFLUX DISEASE WITHOUT ESOPHAGITIS: ICD-10-CM

## 2022-08-14 DIAGNOSIS — K21.9 GASTROESOPHAGEAL REFLUX DISEASE WITHOUT ESOPHAGITIS: ICD-10-CM

## 2022-08-15 RX ORDER — OMEPRAZOLE 20 MG/1
CAPSULE, DELAYED RELEASE ORAL
Qty: 90 CAPSULE | Refills: 0 | OUTPATIENT
Start: 2022-08-15

## 2022-08-15 RX ORDER — OMEPRAZOLE 20 MG/1
CAPSULE, DELAYED RELEASE ORAL
Qty: 90 CAPSULE | Refills: 0 | Status: SHIPPED | OUTPATIENT
Start: 2022-08-15

## 2022-08-22 DIAGNOSIS — J45.909 MODERATE ASTHMA, UNSPECIFIED WHETHER COMPLICATED, UNSPECIFIED WHETHER PERSISTENT: ICD-10-CM

## 2022-08-23 NOTE — TELEPHONE ENCOUNTER
Medication:   Requested Prescriptions     Pending Prescriptions Disp Refills    VENTOLIN  (90 Base) MCG/ACT inhaler [Pharmacy Med Name: VENTOLIN HFA INH W/DOS CTR 200PUFFS] 18 g      Sig: INHALE 1 PUFF INTO THE LUNGS EVERY 4 HOURS AS NEEDED FOR WHEEZING        Last Filled:  5/16/2022, 1, 1    Patient Phone Number: [phone] (home)     Last appt: 12/3/2021   Next appt: Visit date not found    Last OARRS: No flowsheet data found.

## 2022-11-09 DIAGNOSIS — I10 ESSENTIAL HYPERTENSION: ICD-10-CM

## 2022-11-09 RX ORDER — AMLODIPINE BESYLATE 10 MG/1
10 TABLET ORAL DAILY
Qty: 30 TABLET | Refills: 0 | Status: SHIPPED | OUTPATIENT
Start: 2022-11-09 | End: 2022-11-30 | Stop reason: SDUPTHER

## 2022-11-09 NOTE — TELEPHONE ENCOUNTER
Medication:   Requested Prescriptions     Pending Prescriptions Disp Refills    amLODIPine (NORVASC) 10 MG tablet [Pharmacy Med Name: AMLODIPINE BESYLATE 10MG TABLETS] 90 tablet 1     Sig: TAKE 1 TABLET BY MOUTH DAILY        Last Filled:  5/16/22    Patient Phone Number: [phone] (home)     Last appt: 12/3/2021   Next appt: Visit date not found    Last OARRS: No flowsheet data found.

## 2022-11-17 DIAGNOSIS — K21.9 GASTROESOPHAGEAL REFLUX DISEASE WITHOUT ESOPHAGITIS: ICD-10-CM

## 2022-11-18 NOTE — TELEPHONE ENCOUNTER
Medication:   Requested Prescriptions     Pending Prescriptions Disp Refills    omeprazole (PRILOSEC) 20 MG delayed release capsule [Pharmacy Med Name: OMEPRAZOLE 20MG CAPSULES] 90 capsule 0     Sig: TAKE 1 CAPSULE BY MOUTH EVERY DAY        Last Filled:  8/15/22, 90, 0    Patient Phone Number: [phone] (home)     Last appt: 12/3/2021   Next appt: 11/22/2022    Last OARRS: No flowsheet data found.

## 2022-11-19 DIAGNOSIS — J45.909 MODERATE ASTHMA, UNSPECIFIED WHETHER COMPLICATED, UNSPECIFIED WHETHER PERSISTENT: ICD-10-CM

## 2022-11-19 RX ORDER — OMEPRAZOLE 20 MG/1
CAPSULE, DELAYED RELEASE ORAL
Qty: 90 CAPSULE | Refills: 0 | Status: SHIPPED | OUTPATIENT
Start: 2022-11-19 | End: 2022-11-30 | Stop reason: SDUPTHER

## 2022-11-21 RX ORDER — MOMETASONE FUROATE AND FORMOTEROL FUMARATE DIHYDRATE 200; 5 UG/1; UG/1
AEROSOL RESPIRATORY (INHALATION)
Qty: 13 G | Refills: 0 | Status: SHIPPED | OUTPATIENT
Start: 2022-11-21 | End: 2022-11-30 | Stop reason: SDUPTHER

## 2022-11-21 NOTE — TELEPHONE ENCOUNTER
Medication:   Requested Prescriptions     Pending Prescriptions Disp Refills    DULERA 200-5 MCG/ACT inhaler [Pharmacy Med Name: Yasmeenta [place] 200-5MCG ORAL INHALER 120INH] 13 g 3     Sig: INHALE 2 PUFFS INTO THE LUNGS TWICE DAILY    VENTOLIN  (90 Base) MCG/ACT inhaler [Pharmacy Med Name: VENTOLIN HFA INH W/DOS CTR 200PUFFS] 18 g 1     Sig: INHALE 1 PUFF INTO THE LUNGS EVERY 4 HOURS AS NEEDED FOR WHEEZING        Last Filled:  5/16/2022, 1, 3  8/23/22, 18g, 1    Patient Phone Number: [phone] (home)     Last appt: 12/3/2021   Next appt: 11/22/2022    Last OARRS: No flowsheet data found.

## 2022-11-30 ENCOUNTER — OFFICE VISIT (OUTPATIENT)
Dept: FAMILY MEDICINE CLINIC | Age: 52
End: 2022-11-30
Payer: MEDICAID

## 2022-11-30 VITALS
HEART RATE: 65 BPM | BODY MASS INDEX: 65 KG/M2 | SYSTOLIC BLOOD PRESSURE: 128 MMHG | OXYGEN SATURATION: 97 % | DIASTOLIC BLOOD PRESSURE: 83 MMHG | HEIGHT: 75 IN

## 2022-11-30 DIAGNOSIS — J45.909 MODERATE ASTHMA, UNSPECIFIED WHETHER COMPLICATED, UNSPECIFIED WHETHER PERSISTENT: ICD-10-CM

## 2022-11-30 DIAGNOSIS — F32.A DEPRESSION, UNSPECIFIED DEPRESSION TYPE: ICD-10-CM

## 2022-11-30 DIAGNOSIS — Z00.00 ANNUAL PHYSICAL EXAM: Primary | ICD-10-CM

## 2022-11-30 DIAGNOSIS — K21.9 GASTROESOPHAGEAL REFLUX DISEASE WITHOUT ESOPHAGITIS: ICD-10-CM

## 2022-11-30 DIAGNOSIS — F12.20 CANNABIS DEPENDENCE, DAILY USE (HCC): ICD-10-CM

## 2022-11-30 DIAGNOSIS — I10 PRIMARY HYPERTENSION: ICD-10-CM

## 2022-11-30 DIAGNOSIS — E66.01 CLASS 3 SEVERE OBESITY DUE TO EXCESS CALORIES WITH SERIOUS COMORBIDITY AND BODY MASS INDEX (BMI) OF 60.0 TO 69.9 IN ADULT (HCC): ICD-10-CM

## 2022-11-30 DIAGNOSIS — I42.0 DILATED CARDIOMYOPATHY (HCC): ICD-10-CM

## 2022-11-30 DIAGNOSIS — Z12.11 COLON CANCER SCREENING: ICD-10-CM

## 2022-11-30 PROCEDURE — G8417 CALC BMI ABV UP PARAM F/U: HCPCS | Performed by: NURSE PRACTITIONER

## 2022-11-30 PROCEDURE — G8484 FLU IMMUNIZE NO ADMIN: HCPCS | Performed by: NURSE PRACTITIONER

## 2022-11-30 PROCEDURE — 1036F TOBACCO NON-USER: CPT | Performed by: NURSE PRACTITIONER

## 2022-11-30 PROCEDURE — 99396 PREV VISIT EST AGE 40-64: CPT | Performed by: NURSE PRACTITIONER

## 2022-11-30 PROCEDURE — G8427 DOCREV CUR MEDS BY ELIG CLIN: HCPCS | Performed by: NURSE PRACTITIONER

## 2022-11-30 PROCEDURE — 3078F DIAST BP <80 MM HG: CPT | Performed by: NURSE PRACTITIONER

## 2022-11-30 PROCEDURE — 99214 OFFICE O/P EST MOD 30 MIN: CPT | Performed by: NURSE PRACTITIONER

## 2022-11-30 PROCEDURE — 3017F COLORECTAL CA SCREEN DOC REV: CPT | Performed by: NURSE PRACTITIONER

## 2022-11-30 PROCEDURE — 3074F SYST BP LT 130 MM HG: CPT | Performed by: NURSE PRACTITIONER

## 2022-11-30 RX ORDER — ALBUTEROL SULFATE 90 UG/1
AEROSOL, METERED RESPIRATORY (INHALATION)
Qty: 18 G | Refills: 5 | Status: SHIPPED | OUTPATIENT
Start: 2022-11-30

## 2022-11-30 RX ORDER — AMLODIPINE BESYLATE 10 MG/1
10 TABLET ORAL DAILY
Qty: 90 TABLET | Refills: 1 | Status: SHIPPED | OUTPATIENT
Start: 2022-11-30

## 2022-11-30 RX ORDER — OMEPRAZOLE 20 MG/1
20 CAPSULE, DELAYED RELEASE ORAL DAILY
Qty: 90 CAPSULE | Refills: 1 | Status: SHIPPED | OUTPATIENT
Start: 2022-11-30

## 2022-11-30 RX ORDER — METOPROLOL SUCCINATE 25 MG/1
25 TABLET, EXTENDED RELEASE ORAL DAILY
Qty: 90 TABLET | Refills: 1 | Status: SHIPPED | OUTPATIENT
Start: 2022-11-30

## 2022-11-30 SDOH — ECONOMIC STABILITY: FOOD INSECURITY: WITHIN THE PAST 12 MONTHS, THE FOOD YOU BOUGHT JUST DIDN'T LAST AND YOU DIDN'T HAVE MONEY TO GET MORE.: NEVER TRUE

## 2022-11-30 SDOH — ECONOMIC STABILITY: FOOD INSECURITY: WITHIN THE PAST 12 MONTHS, YOU WORRIED THAT YOUR FOOD WOULD RUN OUT BEFORE YOU GOT MONEY TO BUY MORE.: NEVER TRUE

## 2022-11-30 ASSESSMENT — ENCOUNTER SYMPTOMS
NAUSEA: 0
WHEEZING: 0
SINUS PAIN: 0
CONSTIPATION: 0
ABDOMINAL PAIN: 0
VOMITING: 0
DIARRHEA: 0
COUGH: 0
BLOOD IN STOOL: 0
SINUS PRESSURE: 0
SHORTNESS OF BREATH: 0
CHEST TIGHTNESS: 0
EYES NEGATIVE: 1
SORE THROAT: 0

## 2022-11-30 ASSESSMENT — PATIENT HEALTH QUESTIONNAIRE - PHQ9
9. THOUGHTS THAT YOU WOULD BE BETTER OFF DEAD, OR OF HURTING YOURSELF: 0
SUM OF ALL RESPONSES TO PHQ QUESTIONS 1-9: 8
2. FEELING DOWN, DEPRESSED OR HOPELESS: 1
SUM OF ALL RESPONSES TO PHQ QUESTIONS 1-9: 8
SUM OF ALL RESPONSES TO PHQ QUESTIONS 1-9: 8
4. FEELING TIRED OR HAVING LITTLE ENERGY: 3
SUM OF ALL RESPONSES TO PHQ QUESTIONS 1-9: 8
1. LITTLE INTEREST OR PLEASURE IN DOING THINGS: 1
3. TROUBLE FALLING OR STAYING ASLEEP: 1
7. TROUBLE CONCENTRATING ON THINGS, SUCH AS READING THE NEWSPAPER OR WATCHING TELEVISION: 0
SUM OF ALL RESPONSES TO PHQ9 QUESTIONS 1 & 2: 2
6. FEELING BAD ABOUT YOURSELF - OR THAT YOU ARE A FAILURE OR HAVE LET YOURSELF OR YOUR FAMILY DOWN: 1
5. POOR APPETITE OR OVEREATING: 1
10. IF YOU CHECKED OFF ANY PROBLEMS, HOW DIFFICULT HAVE THESE PROBLEMS MADE IT FOR YOU TO DO YOUR WORK, TAKE CARE OF THINGS AT HOME, OR GET ALONG WITH OTHER PEOPLE: 1
8. MOVING OR SPEAKING SO SLOWLY THAT OTHER PEOPLE COULD HAVE NOTICED. OR THE OPPOSITE, BEING SO FIGETY OR RESTLESS THAT YOU HAVE BEEN MOVING AROUND A LOT MORE THAN USUAL: 0

## 2022-11-30 ASSESSMENT — SOCIAL DETERMINANTS OF HEALTH (SDOH): HOW HARD IS IT FOR YOU TO PAY FOR THE VERY BASICS LIKE FOOD, HOUSING, MEDICAL CARE, AND HEATING?: SOMEWHAT HARD

## 2022-11-30 NOTE — PROGRESS NOTES
2022     Laurence Caldwell (:  1970) is a 50 y.o. male, here for evaluation of the following medical concerns:    Chief Complaint   Patient presents with    Annual Exam    Medication Refill    Hypertension    Depression     Past medical, surgical, family and social history, as well as current medications and allergies, were reviewed and updated with the patient. Patient is here for annual physical.    Hypertension:  Home blood pressure monitoring: No.  He is not adherent to a low sodium diet. Patient denies chest pain, shortness of breath, headache, lightheadedness, blurred vision, peripheral edema, palpitations, dry cough, and fatigue. Antihypertensive medication side effects: no medication side effects noted. Use of agents associated with hypertension: none. Depression:  has had depression in the past, tried zoloft but stopped on his own. Has ups and downs. Believes this is related to him working from home, feels better when busy but when not busy feels down. Denies suicidal/homicidal ideations. Father did commit suicide. Dilated Cardiomyopathy:  was seen by Dr. Skip Phillip in hospital 2021. Has not followed up since. Denies chest pain, shortness of breath, dizziness. Obesity:  currently not following any type of diet, not exercising. Did reach out to [address] [place] [address] and started the non-surgical side but did not like the program so he stopped. Asthma:  continues to use Dulera daily, Ventolin PRN (rarely), quit smoking cigarettes, but smokes marijuana daily.                                       Sodium (mmol/L)   Date Value   2021 141    BUN (mg/dL)   Date Value   2021 11    Glucose (mg/dL)   Date Value   2021 111 (H)      Potassium reflex Magnesium (mmol/L)   Date Value   2021 3.4 (L)    Creatinine (mg/dL)   Date Value   2021 0.6 (L)         Wt Readings from Last 3 Encounters:   21 (!) 520 lb (235.9 kg)   08/10/21 (!) 500 lb (226.8 kg)   19 (!) 465 lb (210.9 kg)     Review of Systems   Constitutional:  Negative for chills, diaphoresis, fatigue and fever. HENT:  Negative for congestion, ear discharge, ear pain, sinus pressure, sinus pain and sore throat. Eyes: Negative. Respiratory:  Negative for cough, chest tightness, shortness of breath and wheezing. Cardiovascular:  Negative for chest pain, palpitations and leg swelling. Gastrointestinal:  Negative for abdominal pain, blood in stool, constipation, diarrhea, nausea and vomiting. Endocrine: Negative. Genitourinary: Negative. Musculoskeletal: Negative. Skin: Negative. Neurological:  Negative for dizziness, weakness and headaches. Psychiatric/Behavioral: Negative. Prior to Visit Medications    Medication Sig Taking?  Authorizing Provider   mometasone-formoterol (DULERA) 200-5 MCG/ACT inhaler INHALE 2 PUFFS INTO THE LUNGS TWICE DAILY Yes DYLON Fuller CNP   albuterol sulfate HFA (VENTOLIN HFA) 108 (90 Base) MCG/ACT inhaler INHALE 1 PUFF INTO THE LUNGS EVERY 4 HOURS AS NEEDED FOR WHEEZING Yes DYLON Fuller CNP   omeprazole (PRILOSEC) 20 MG delayed release capsule Take 1 capsule by mouth Daily Yes DYLON Fuller CNP   amLODIPine (NORVASC) 10 MG tablet Take 1 tablet by mouth daily Yes DYLON Fuller CNP   metoprolol succinate (TOPROL XL) 25 MG extended release tablet Take 1 tablet by mouth daily Yes DYLON Fuller CNP      Social History     Tobacco Use    Smoking status: Former     Packs/day: 0.25     Years: 11.00     Pack years: 2.75     Types: Cigarettes     Quit date: 2002     Years since quittin.4    Smokeless tobacco: Former     Quit date: 1995   Vaping Use    Vaping Use: Never used   Substance Use Topics    Alcohol use: Never    Drug use: Yes     Types: Marijuana (Weed)      Vitals:    22 0928   BP: 128/83   Pulse: 65   SpO2: 97%   Weight: Comment: patient refused   Height: 6' 3\" (1.905 m) Estimated body mass index is 65 kg/m² as calculated from the following:    Height as of this encounter: 6' 3\" (1.905 m). Weight as of 11/16/21: 520 lb (235.9 kg). Physical Exam  Vitals and nursing note reviewed. Constitutional:       General: He is awake. He is not in acute distress. Appearance: Normal appearance. He is well-developed and well-groomed. He is morbidly obese. He is not ill-appearing, toxic-appearing or diaphoretic. HENT:      Head: Normocephalic and atraumatic. Right Ear: Tympanic membrane, ear canal and external ear normal.      Left Ear: Tympanic membrane, ear canal and external ear normal.      Nose: Nose normal.      Mouth/Throat:      Lips: Pink. Mouth: Mucous membranes are moist.      Pharynx: Oropharynx is clear. Eyes:      Extraocular Movements: Extraocular movements intact. Conjunctiva/sclera: Conjunctivae normal.      Pupils: Pupils are equal, round, and reactive to light. Neck:      Thyroid: No thyroid mass, thyromegaly or thyroid tenderness. Vascular: No carotid bruit or JVD. Cardiovascular:      Rate and Rhythm: Normal rate and regular rhythm. Heart sounds: Normal heart sounds, S1 normal and S2 normal. No murmur heard. Pulmonary:      Effort: Pulmonary effort is normal.      Breath sounds: Normal breath sounds and air entry. No decreased breath sounds. Abdominal:      General: Abdomen is flat. Bowel sounds are normal.      Palpations: Abdomen is soft. Musculoskeletal:         General: Normal range of motion. Cervical back: Normal range of motion and neck supple. Right lower leg: No edema. Left lower leg: No edema. Lymphadenopathy:      Head:      Right side of head: No submental, submandibular, tonsillar, preauricular or posterior auricular adenopathy. Left side of head: No submental, submandibular, tonsillar, preauricular or posterior auricular adenopathy. Cervical: No cervical adenopathy.       Upper Body: Right upper body: No supraclavicular adenopathy. Left upper body: No supraclavicular adenopathy. Skin:     General: Skin is warm and dry. Capillary Refill: Capillary refill takes less than 2 seconds. Neurological:      General: No focal deficit present. Mental Status: He is alert and oriented to person, place, and time. GCS: GCS eye subscore is 4. GCS verbal subscore is 5. GCS motor subscore is 6. Psychiatric:         Attention and Perception: Attention and perception normal.         Mood and Affect: Mood and affect normal.         Speech: Speech normal.         Behavior: Behavior normal. Behavior is cooperative. Thought Content: Thought content normal.         Judgment: Judgment normal.     ASSESSMENT/PLAN:  1. Annual physical exam    2. Class 3 severe obesity due to excess calories with serious comorbidity and body mass index (BMI) of 60.0 to 69.9 in [facility])  Unstable  Recommended at least 30 minutes of aerobic exercise daily. Recommend starting weight loss journey, reviewed weight loss surgery options as well as non-surgical options    3. Depression, unspecified depression type  Unstable  Patient is unsure about trying medication again  Recommend getting on a regular schedule as this may help his symptoms   Denies suicidal/homicidal ideations    4. Dilated cardiomyopathy ([address]  Recommend follow up with Dr. Beatrice Johnson as soon as possible  - Hemoglobin A1C; Future  - CBC with Auto Differential; Future  - Comprehensive Metabolic Panel, Fasting; Future  - Hepatitis C Antibody; Future  - Lipid, Fasting; Future  - HIV Screen; Future  - TSH with Reflex to FT4; Future  - Vitamin D 25 Hydroxy; Future    5. Primary hypertension  Stable  DASH diet  - amLODIPine (NORVASC) 10 MG tablet; Take 1 tablet by mouth daily  Dispense: 90 tablet; Refill: 1  - metoprolol succinate (TOPROL XL) 25 MG extended release tablet; Take 1 tablet by mouth daily  Dispense: 90 tablet; Refill: 1    6.  Colon cancer screening  - Fecal DNA Colorectal cancer screening ([facility])    7. Moderate asthma, unspecified whether complicated, unspecified whether persistent  Stable  Recommend cessation of marijuana use  - mometasone-formoterol (DULERA) 200-5 MCG/ACT inhaler; INHALE 2 PUFFS INTO THE LUNGS TWICE DAILY  Dispense: 13 g; Refill: 5  - albuterol sulfate HFA (VENTOLIN HFA) 108 (90 Base) MCG/ACT inhaler; INHALE 1 PUFF INTO THE LUNGS EVERY 4 HOURS AS NEEDED FOR WHEEZING  Dispense: 18 g; Refill: 5    8. Gastroesophageal reflux disease without esophagitis  stable  - omeprazole (PRILOSEC) 20 MG delayed release capsule; Take 1 capsule by mouth Daily  Dispense: 90 capsule; Refill: 1    10. Cannabis dependence, daily use (HCC)  Discussed the risks of smoking daily, including CAD, CVA up to death    Return in about 3 months (around 2/28/2023), or if symptoms worsen or fail to improve.

## 2023-06-14 RX ORDER — METOPROLOL SUCCINATE 25 MG/1
25 TABLET, EXTENDED RELEASE ORAL DAILY
Qty: 90 TABLET | Refills: 0 | Status: SHIPPED | OUTPATIENT
Start: 2023-06-14

## 2023-07-03 NOTE — ED PROVIDER NOTES
OB at bedside     Nydia Sutton, RN  07/03/23 [id number] APPEARANCE: Awake and alert. Cooperative. No acute distress. HEAD: Normocephalic. Atraumatic. EYES: PERRL. EOM's grossly intact. ENT: Mucous membranes are moist.   NECK: Supple. Normal ROM. CHEST: Equal symmetric chest rise. RRR  LUNGS: Breathing is unlabored. Speaking comfortably in full sentences. CTAB  ABDOMEN: Nondistended, nontender  MUSCULOSKELETAL:  RLE: Proximal calf ttp posteriorly and laterally with varicosities noted diffusely in the RLE. No anterior or medial knee tenderness or joint swelling. No ttp of the ankle, hip or other parts of the RLE. 2+ DP and PT. Sensation and motor function fully intact. Full range of motion of all major joints. No erythema, bruising, or lacerations. Compartments are soft. 2+ patellar reflex. Achilles nontender and intact. Able to bear weight. No joint swelling or effusions are noted. LLE: No tenderness. 2+ DP and PT. Sensation and motor function fully intact. Full range of motion of all major joints. No erythema, bruising, or lacerations. Compartments are soft. 2+ patellar reflex. Achilles nontender and intact. Able to bear weight. No joint swelling or effusions are noted. SKIN: Warm and dry. NEUROLOGICAL: Alert and oriented. Strength is 5/5 in all extremities and sensation is intact. RADIOLOGY    Xr Knee Right (1-2 Views)    Result Date: 7/9/2019  EXAMINATION: 2 XRAY VIEWS OF THE RIGHT KNEE 7/9/2019 3:32 pm COMPARISON: None. HISTORY: ORDERING SYSTEM PROVIDED HISTORY: R knee pain TECHNOLOGIST PROVIDED HISTORY: Reason for exam:->R knee pain Reason for Exam: Pt states right knee pain. Hx of blood clots. No known injury. Acuity: Acute Type of Exam: Initial FINDINGS: Moderate medial compartmental joint space narrowing. Moderate patellofemoral degenerative changes. Venous varicosities are noted in the soft tissues. No fracture or effusion. Bicompartmental degenerative changes. No acute bony abnormality appreciated.  Venous varicosities

## 2023-10-23 DIAGNOSIS — J45.909 MODERATE ASTHMA, UNSPECIFIED WHETHER COMPLICATED, UNSPECIFIED WHETHER PERSISTENT: ICD-10-CM

## 2023-10-23 NOTE — TELEPHONE ENCOUNTER
Medication:   Requested Prescriptions     Pending Prescriptions Disp Refills    mometasone-formoterol (DULERA) 200-5 MCG/ACT inhaler 13 g 5     Sig: INHALE 2 PUFFS INTO THE LUNGS TWICE DAILY        Last Filled:  11/30/2022, 13g, 5    Patient Phone Number: [phone] (home)     Last appt: 11/30/2022   Next appt: Visit date not found    Last OARRS:        No data to display

## 2023-10-23 NOTE — TELEPHONE ENCOUNTER
Medication and Quantity requested:      mometasone-formoterol [facility]) 200-5 MCG/ACT inhaler [[id number]]        Last Visit    11/30/22    Pharmacy and phone number updated in [facility]:  yes      Lina

## 2023-11-08 NOTE — TELEPHONE ENCOUNTER
LVMTCB Pt called and requested a refill of Cialis be sent to the [facility] on [address].

## 2023-12-20 DIAGNOSIS — J45.909 MODERATE ASTHMA, UNSPECIFIED WHETHER COMPLICATED, UNSPECIFIED WHETHER PERSISTENT: ICD-10-CM

## 2023-12-20 RX ORDER — ALBUTEROL SULFATE 90 UG/1
AEROSOL, METERED RESPIRATORY (INHALATION)
Qty: 18 G | Refills: 0 | OUTPATIENT
Start: 2023-12-20

## 2023-12-20 RX ORDER — METOPROLOL SUCCINATE 25 MG/1
25 TABLET, EXTENDED RELEASE ORAL DAILY
Qty: 30 TABLET | Refills: 0 | OUTPATIENT
Start: 2023-12-20

## 2023-12-20 NOTE — TELEPHONE ENCOUNTER
Medication:   Requested Prescriptions     Pending Prescriptions Disp Refills    mometasone-formoterol (DULERA) 200-5 MCG/ACT inhaler [Pharmacy Med Name: DULERA 200-5MCG ORAL INHALER 120INH] 13 g 0     Sig: INHALE 2 PUFFS INTO THE LUNGS TWICE DAILY        Last Filled:  1/23/2023, 13g, 0    Patient Phone Number: [phone] (home)     Last appt: 11/30/2022   Next appt: 12/28/2023    Last OARRS:        No data to display

## 2023-12-20 NOTE — TELEPHONE ENCOUNTER
Medication:   Requested Prescriptions     Pending Prescriptions Disp Refills    metoprolol succinate (TOPROL XL) 25 MG extended release tablet [Pharmacy Med Name: METOPROLOL ER SUCCINATE 25MG TABS] 30 tablet 0     Sig: TAKE 1 TABLET BY MOUTH DAILY    albuterol sulfate HFA (PROVENTIL;VENTOLIN;PROAIR) 108 (90 Base) MCG/ACT inhaler [Pharmacy Med Name: ALBUTEROL HFA INH(200 PUFFS) 18GM] 18 g 0     Sig: INHALE 1 PUFF INTO THE LUNGS EVERY 4 HOURS AS NEEDED FOR WHEEZING        Last Filled:  10/23/2023, 30, 0  10/23/2023, 18g, 0    Patient Phone Number: [phone] (home)     Last appt: 11/30/2022   Next appt: 1/28/2023    Last OARRS:        No data to display

## 2023-12-28 NOTE — PROGRESS NOTES
Patient arrived today for his appointment. He did not want to do a physical today, he only wanted refills of his pulmonary medications. Refused to weigh himself and refused blood work. I did explain that he must have lab work completed for refills as medications could be harmful. After very long discussion, he agrees to return for annual physical exam, fasting lab work and refills.

## 2024-02-27 ENCOUNTER — HOSPITAL ENCOUNTER (EMERGENCY)
Age: 54
Discharge: HOME | End: 2024-02-27
Payer: MEDICAID

## 2024-02-27 VITALS
DIASTOLIC BLOOD PRESSURE: 96 MMHG | TEMPERATURE: 98 F | SYSTOLIC BLOOD PRESSURE: 145 MMHG | HEART RATE: 80 BPM | HEIGHT: 75 IN | OXYGEN SATURATION: 96 % | WEIGHT: 315 LBS | RESPIRATION RATE: 18 BRPM | BODY MASS INDEX: 39.17 KG/M2

## 2024-02-27 DIAGNOSIS — S46.912A STRAIN OF LEFT SHOULDER, INITIAL ENCOUNTER: Primary | ICD-10-CM

## 2024-02-27 PROCEDURE — 99283 EMERGENCY DEPT VISIT LOW MDM: CPT

## 2024-02-27 RX ORDER — NAPROXEN 500 MG/1
500 TABLET ORAL 2 TIMES DAILY WITH MEALS
Qty: 60 TABLET | Refills: 0 | Status: SHIPPED | OUTPATIENT
Start: 2024-02-27

## 2024-02-27 RX ORDER — CYCLOBENZAPRINE HCL 10 MG
10 TABLET ORAL 3 TIMES DAILY PRN
Qty: 20 TABLET | Refills: 0 | Status: SHIPPED | OUTPATIENT
Start: 2024-02-27 | End: 2024-03-08

## 2024-02-27 ASSESSMENT — LIFESTYLE VARIABLES
HOW OFTEN DO YOU HAVE A DRINK CONTAINING ALCOHOL: NEVER
HOW MANY STANDARD DRINKS CONTAINING ALCOHOL DO YOU HAVE ON A TYPICAL DAY: PATIENT DOES NOT DRINK

## 2024-02-27 ASSESSMENT — ENCOUNTER SYMPTOMS
NAUSEA: 0
VOMITING: 0
DIARRHEA: 0
CHEST TIGHTNESS: 0
ABDOMINAL PAIN: 0
SHORTNESS OF BREATH: 0

## 2024-02-27 ASSESSMENT — PAIN SCALES - GENERAL: PAINLEVEL_OUTOF10: 8

## 2024-02-27 ASSESSMENT — PAIN - FUNCTIONAL ASSESSMENT: PAIN_FUNCTIONAL_ASSESSMENT: 0-10

## 2024-02-27 NOTE — ED PROVIDER NOTES
of Record.    FINAL IMPRESSION      1. Strain of left shoulder, initial encounter          DISPOSITION/PLAN     DISPOSITION Decision To Discharge 02/27/2024 09:25:09 AM      PATIENT REFERRED TO:  Harrison Cummings MD  [address]  [address]  [facility] [id number] [phone]    Schedule an appointment as soon as possible for a visit         DISCHARGE MEDICATIONS:  Discharge Medication List as of 2/27/2024  9:27 AM        START taking these medications    Details   naproxen (NAPROSYN) 500 MG tablet Take 1 tablet by mouth 2 times daily (with meals), Disp-60 tablet, R-0Normal      cyclobenzaprine (FLEXERIL) 10 MG tablet Take 1 tablet by mouth 3 times daily as needed for Muscle spasms, Disp-20 tablet, R-0, DAWNormal             DISCONTINUED MEDICATIONS:  Discharge Medication List as of 2/27/2024  9:27 AM                 (Please note that portions of this note were completed with a voice recognition program.  Efforts were made to edit the dictations but occasionally words are mis-transcribed.)    Sanjeev Chino PA-C (electronically signed)           Sanjeev Chino PA-C  02/27/24 1021

## 2024-02-29 ENCOUNTER — OFFICE VISIT (OUTPATIENT)
Dept: ORTHOPEDIC SURGERY | Age: 54
End: 2024-02-29
Payer: MEDICAID

## 2024-02-29 VITALS — BODY MASS INDEX: 39.17 KG/M2 | WEIGHT: 315 LBS | HEIGHT: 75 IN

## 2024-02-29 DIAGNOSIS — M75.82 ROTATOR CUFF TENDONITIS, LEFT: Primary | ICD-10-CM

## 2024-02-29 DIAGNOSIS — M25.512 ACUTE PAIN OF LEFT SHOULDER: ICD-10-CM

## 2024-02-29 DIAGNOSIS — M19.012 ARTHRITIS OF LEFT ACROMIOCLAVICULAR JOINT: ICD-10-CM

## 2024-02-29 PROCEDURE — G8427 DOCREV CUR MEDS BY ELIG CLIN: HCPCS | Performed by: ORTHOPAEDIC SURGERY

## 2024-02-29 PROCEDURE — G8484 FLU IMMUNIZE NO ADMIN: HCPCS | Performed by: ORTHOPAEDIC SURGERY

## 2024-02-29 PROCEDURE — 1036F TOBACCO NON-USER: CPT | Performed by: ORTHOPAEDIC SURGERY

## 2024-02-29 PROCEDURE — 3017F COLORECTAL CA SCREEN DOC REV: CPT | Performed by: ORTHOPAEDIC SURGERY

## 2024-02-29 PROCEDURE — G8417 CALC BMI ABV UP PARAM F/U: HCPCS | Performed by: ORTHOPAEDIC SURGERY

## 2024-02-29 PROCEDURE — 99203 OFFICE O/P NEW LOW 30 MIN: CPT | Performed by: ORTHOPAEDIC SURGERY

## 2024-02-29 RX ORDER — CYCLOBENZAPRINE HCL 10 MG
10 TABLET ORAL NIGHTLY PRN
Qty: 10 TABLET | Refills: 0 | Status: SHIPPED | OUTPATIENT
Start: 2024-02-29 | End: 2024-03-10

## 2024-02-29 NOTE — PROGRESS NOTES
Nicolas WATERS Amandoia  [id number]  February 29, 2024    Chief Complaint   Patient presents with    Shoulder Pain     left           History: The patient is a 53-year-old gentleman who is here for evaluation of his left shoulder.  He is right-hand dominant.  The patient reports falling in December.  He reached behind himself and he tried to catch himself.  After the fall, he was having some elbow pain, shoulder pain and neck pain.  The neck and elbow pain resolved.  The shoulder issues were gradually improving, but he was seen recently in the emergency room for evaluation.  He was given Naprosyn and Flexeril which seemed to help significantly.  He was having some difficulty sleeping on his side, but this is gradually improving.  He does have mild pain with overhead activities.  This is a consult from MARCIA Nix for left shoulder pain and swelling.      The patient's  past medical history, medications, allergies,  family history, social history, and review of systems have been reviewed, and dated and are recorded in the chart.  Pertinent items are noted in HPI.  Review of systems reviewed from Pertinent History Form dated on 2/29 and available in the patient's chart under the Media tab.     Ht 1.905 m (6' 3\")   Wt (!) 200 kg (441 lb)   BMI 55.12 kg/m²     Physical: The patient presents today in no acute distress.He is alert and oriented to person, place and time. He displays appropriate mood and affect.  He is well dressed, nourished and  groomed. He has a normal affect. Examination of the neck reveals no evidence of tenderness. Spurling's sign is negative. Active range of motion of the left shoulder is: 175 degrees abduction, 175 degrees forward flexion, 45 degrees of external rotation and internal rotation to L1. Passive range of motion of the left shoulder is: 180 degrees abduction, 180 degrees forward flexion, 50 degrees of external rotation and internal rotation to T11. Active and passive range of motion

## 2024-04-10 ENCOUNTER — HOSPITAL ENCOUNTER (EMERGENCY)
Age: 54
Discharge: HOME | End: 2024-04-10
Attending: EMERGENCY MEDICINE
Payer: MEDICAID

## 2024-04-10 ENCOUNTER — APPOINTMENT (OUTPATIENT)
Dept: CT IMAGING | Age: 54
End: 2024-04-10
Payer: MEDICAID

## 2024-04-10 ENCOUNTER — APPOINTMENT (OUTPATIENT)
Dept: GENERAL RADIOLOGY | Age: 54
End: 2024-04-10
Payer: MEDICAID

## 2024-04-10 ENCOUNTER — TELEPHONE (OUTPATIENT)
Dept: CARDIOLOGY CLINIC | Age: 54
End: 2024-04-10

## 2024-04-10 VITALS
TEMPERATURE: 98 F | SYSTOLIC BLOOD PRESSURE: 135 MMHG | BODY MASS INDEX: 55 KG/M2 | DIASTOLIC BLOOD PRESSURE: 96 MMHG | RESPIRATION RATE: 16 BRPM | OXYGEN SATURATION: 100 % | HEART RATE: 79 BPM | WEIGHT: 315 LBS

## 2024-04-10 DIAGNOSIS — K42.9 UMBILICAL HERNIA WITHOUT OBSTRUCTION OR GANGRENE: ICD-10-CM

## 2024-04-10 DIAGNOSIS — I25.83 CORONARY ARTERY DISEASE DUE TO LIPID RICH PLAQUE: ICD-10-CM

## 2024-04-10 DIAGNOSIS — I42.9 CARDIOMYOPATHY, UNSPECIFIED TYPE (HCC): Primary | ICD-10-CM

## 2024-04-10 DIAGNOSIS — I48.91 ATRIAL FIBRILLATION, UNSPECIFIED TYPE (HCC): Primary | ICD-10-CM

## 2024-04-10 DIAGNOSIS — I48.91 NEW ONSET ATRIAL FIBRILLATION (HCC): Primary | ICD-10-CM

## 2024-04-10 DIAGNOSIS — Z87.09 HISTORY OF ASTHMA: ICD-10-CM

## 2024-04-10 DIAGNOSIS — I25.10 CORONARY ARTERY DISEASE DUE TO LIPID RICH PLAQUE: ICD-10-CM

## 2024-04-10 DIAGNOSIS — J45.909 MODERATE ASTHMA, UNSPECIFIED WHETHER COMPLICATED, UNSPECIFIED WHETHER PERSISTENT: ICD-10-CM

## 2024-04-10 LAB
ALBUMIN SERPL-MCNC: 4.1 G/DL (ref 3.4–5)
ALBUMIN/GLOB SERPL: 1.5 {RATIO} (ref 1.1–2.2)
ALP SERPL-CCNC: 71 U/L (ref 40–129)
ALT SERPL-CCNC: 14 U/L (ref 10–40)
ANION GAP SERPL CALCULATED.3IONS-SCNC: 9 MMOL/L (ref 3–16)
AST SERPL-CCNC: 20 U/L (ref 15–37)
BACTERIA URNS QL MICRO: NORMAL /HPF
BASOPHILS # BLD: 0.1 K/UL (ref 0–0.2)
BASOPHILS NFR BLD: 0.7 %
BILIRUB SERPL-MCNC: 0.7 MG/DL (ref 0–1)
BILIRUB UR QL STRIP.AUTO: ABNORMAL
BUN SERPL-MCNC: 21 MG/DL (ref 7–20)
CALCIUM SERPL-MCNC: 9.5 MG/DL (ref 8.3–10.6)
CHLORIDE SERPL-SCNC: 102 MMOL/L (ref 99–110)
CHOLEST SERPL-MCNC: 130 MG/DL (ref 0–199)
CLARITY UR: CLEAR
CO2 SERPL-SCNC: 29 MMOL/L (ref 21–32)
COLOR UR: ABNORMAL
CREAT SERPL-MCNC: 1 MG/DL (ref 0.9–1.3)
DEPRECATED RDW RBC AUTO: 13.7 % (ref 12.4–15.4)
EKG ATRIAL RATE: 133 BPM
EKG DIAGNOSIS: NORMAL
EKG Q-T INTERVAL: 376 MS
EKG QRS DURATION: 106 MS
EKG QTC CALCULATION (BAZETT): 444 MS
EKG R AXIS: -7 DEGREES
EKG T AXIS: 45 DEGREES
EKG VENTRICULAR RATE: 84 BPM
EOSINOPHIL # BLD: 0.5 K/UL (ref 0–0.6)
EOSINOPHIL NFR BLD: 6.1 %
EPI CELLS #/AREA URNS AUTO: 1 /HPF (ref 0–5)
GFR SERPLBLD CREATININE-BSD FMLA CKD-EPI: >90 ML/MIN/{1.73_M2}
GLUCOSE SERPL-MCNC: 102 MG/DL (ref 70–99)
GLUCOSE UR STRIP.AUTO-MCNC: NEGATIVE MG/DL
HCT VFR BLD AUTO: 41.5 % (ref 40.5–52.5)
HDLC SERPL-MCNC: 39 MG/DL (ref 40–60)
HGB BLD-MCNC: 14.6 G/DL (ref 13.5–17.5)
HGB UR QL STRIP.AUTO: NEGATIVE
HYALINE CASTS #/AREA URNS AUTO: 3 /LPF (ref 0–8)
KETONES UR STRIP.AUTO-MCNC: ABNORMAL MG/DL
LDL CHOLESTEROL CALCULATED: 72 MG/DL
LEUKOCYTE ESTERASE UR QL STRIP.AUTO: NEGATIVE
LYMPHOCYTES # BLD: 2.1 K/UL (ref 1–5.1)
LYMPHOCYTES NFR BLD: 25.4 %
MAGNESIUM SERPL-MCNC: 2.1 MG/DL (ref 1.8–2.4)
MCH RBC QN AUTO: 31.5 PG (ref 26–34)
MCHC RBC AUTO-ENTMCNC: 35.2 G/DL (ref 31–36)
MCV RBC AUTO: 89.6 FL (ref 80–100)
MONOCYTES # BLD: 0.7 K/UL (ref 0–1.3)
MONOCYTES NFR BLD: 8.6 %
NEUTROPHILS # BLD: 4.9 K/UL (ref 1.7–7.7)
NEUTROPHILS NFR BLD: 59.2 %
NITRITE UR QL STRIP.AUTO: NEGATIVE
NT-PROBNP SERPL-MCNC: 1513 PG/ML (ref 0–124)
PH UR STRIP.AUTO: 5.5 [PH] (ref 5–8)
PLATELET # BLD AUTO: 291 K/UL (ref 135–450)
PMV BLD AUTO: 7.4 FL (ref 5–10.5)
POTASSIUM SERPL-SCNC: 3.5 MMOL/L (ref 3.5–5.1)
PROT SERPL-MCNC: 6.9 G/DL (ref 6.4–8.2)
PROT UR STRIP.AUTO-MCNC: ABNORMAL MG/DL
RBC # BLD AUTO: 4.63 M/UL (ref 4.2–5.9)
RBC CLUMPS #/AREA URNS AUTO: 0 /HPF (ref 0–4)
SODIUM SERPL-SCNC: 140 MMOL/L (ref 136–145)
SP GR UR STRIP.AUTO: >=1.03 (ref 1–1.03)
TRIGL SERPL-MCNC: 97 MG/DL (ref 0–150)
TROPONIN, HIGH SENSITIVITY: 14 NG/L (ref 0–22)
TSH SERPL DL<=0.005 MIU/L-ACNC: 1.58 UIU/ML (ref 0.27–4.2)
UA COMPLETE W REFLEX CULTURE PNL UR: ABNORMAL
UA DIPSTICK W REFLEX MICRO PNL UR: YES
URN SPEC COLLECT METH UR: ABNORMAL
UROBILINOGEN UR STRIP-ACNC: 1 E.U./DL
VLDLC SERPL CALC-MCNC: 19 MG/DL
WBC # BLD AUTO: 8.3 K/UL (ref 4–11)
WBC #/AREA URNS AUTO: 1 /HPF (ref 0–5)

## 2024-04-10 PROCEDURE — 80061 LIPID PANEL: CPT

## 2024-04-10 PROCEDURE — 83735 ASSAY OF MAGNESIUM: CPT

## 2024-04-10 PROCEDURE — 85025 COMPLETE CBC W/AUTO DIFF WBC: CPT

## 2024-04-10 PROCEDURE — 6370000000 HC RX 637 (ALT 250 FOR IP): Performed by: EMERGENCY MEDICINE

## 2024-04-10 PROCEDURE — 84484 ASSAY OF TROPONIN QUANT: CPT

## 2024-04-10 PROCEDURE — 74177 CT ABD & PELVIS W/CONTRAST: CPT

## 2024-04-10 PROCEDURE — 99285 EMERGENCY DEPT VISIT HI MDM: CPT

## 2024-04-10 PROCEDURE — 93010 ELECTROCARDIOGRAM REPORT: CPT | Performed by: INTERNAL MEDICINE

## 2024-04-10 PROCEDURE — 6360000002 HC RX W HCPCS: Performed by: EMERGENCY MEDICINE

## 2024-04-10 PROCEDURE — 81001 URINALYSIS AUTO W/SCOPE: CPT

## 2024-04-10 PROCEDURE — 84443 ASSAY THYROID STIM HORMONE: CPT

## 2024-04-10 PROCEDURE — 83880 ASSAY OF NATRIURETIC PEPTIDE: CPT

## 2024-04-10 PROCEDURE — 71045 X-RAY EXAM CHEST 1 VIEW: CPT

## 2024-04-10 PROCEDURE — 80053 COMPREHEN METABOLIC PANEL: CPT

## 2024-04-10 PROCEDURE — 6360000004 HC RX CONTRAST MEDICATION: Performed by: EMERGENCY MEDICINE

## 2024-04-10 PROCEDURE — 99285 EMERGENCY DEPT VISIT HI MDM: CPT | Performed by: INTERNAL MEDICINE

## 2024-04-10 PROCEDURE — 93005 ELECTROCARDIOGRAM TRACING: CPT | Performed by: EMERGENCY MEDICINE

## 2024-04-10 PROCEDURE — 36415 COLL VENOUS BLD VENIPUNCTURE: CPT

## 2024-04-10 PROCEDURE — 96374 THER/PROPH/DIAG INJ IV PUSH: CPT

## 2024-04-10 PROCEDURE — 83036 HEMOGLOBIN GLYCOSYLATED A1C: CPT

## 2024-04-10 RX ORDER — ACETAMINOPHEN 325 MG/1
650 TABLET ORAL ONCE
Status: COMPLETED | OUTPATIENT
Start: 2024-04-10 | End: 2024-04-10

## 2024-04-10 RX ORDER — ALBUTEROL SULFATE 90 UG/1
1 AEROSOL, METERED RESPIRATORY (INHALATION) EVERY 6 HOURS PRN
Qty: 18 G | Refills: 0 | Status: SHIPPED | OUTPATIENT
Start: 2024-04-10 | End: 2024-04-10

## 2024-04-10 RX ORDER — METOPROLOL SUCCINATE 50 MG/1
50 TABLET, EXTENDED RELEASE ORAL DAILY
Qty: 30 TABLET | Refills: 0 | Status: SHIPPED | OUTPATIENT
Start: 2024-04-10 | End: 2024-04-10

## 2024-04-10 RX ORDER — KETOROLAC TROMETHAMINE 15 MG/ML
15 INJECTION, SOLUTION INTRAMUSCULAR; INTRAVENOUS ONCE
Status: COMPLETED | OUTPATIENT
Start: 2024-04-10 | End: 2024-04-10

## 2024-04-10 RX ORDER — METOPROLOL SUCCINATE 50 MG/1
50 TABLET, EXTENDED RELEASE ORAL DAILY
Qty: 30 TABLET | Refills: 0 | Status: SHIPPED | OUTPATIENT
Start: 2024-04-10 | End: 2024-05-10

## 2024-04-10 RX ORDER — ALBUTEROL SULFATE 90 UG/1
1 AEROSOL, METERED RESPIRATORY (INHALATION) EVERY 6 HOURS PRN
Qty: 18 G | Refills: 0 | Status: SHIPPED | OUTPATIENT
Start: 2024-04-10

## 2024-04-10 RX ADMIN — ACETAMINOPHEN 650 MG: 325 TABLET ORAL at 07:52

## 2024-04-10 RX ADMIN — IOPAMIDOL 75 ML: 755 INJECTION, SOLUTION INTRAVENOUS at 08:49

## 2024-04-10 RX ADMIN — KETOROLAC TROMETHAMINE 15 MG: 15 INJECTION, SOLUTION INTRAMUSCULAR; INTRAVENOUS at 07:52

## 2024-04-10 ASSESSMENT — PAIN SCALES - GENERAL: PAINLEVEL_OUTOF10: 3

## 2024-04-10 ASSESSMENT — PAIN - FUNCTIONAL ASSESSMENT: PAIN_FUNCTIONAL_ASSESSMENT: 0-10

## 2024-04-10 NOTE — TELEPHONE ENCOUNTER
I apologize, this pt needs the echo with difinity, so can we get that scheduled at [facility] so we can do the IV for contrast. Still keep OV with [facility] at [facility].

## 2024-04-10 NOTE — TELEPHONE ENCOUNTER
----- Message from Ollie Lepe MD sent at 4/10/2024  4:27 PM EDT -----  Reviewed  Will discuss at OV

## 2024-04-10 NOTE — TELEPHONE ENCOUNTER
Patient needs to be scheduled at [facility] with Dr Lepe please also coordinate with echo same day. Orders placed.

## 2024-04-10 NOTE — TELEPHONE ENCOUNTER
Called pt to reschedule echo at .  Patient requested to be called tomorrow 4/11/24.  Will call again.  jh

## 2024-04-10 NOTE — ED NOTES
Outpatient pharm confirmed bedside delivery of meds per this nurse. Outpatient pharm to be here shortly with medications.

## 2024-04-10 NOTE — PROGRESS NOTES
CLINICAL PHARMACY NOTE: MEDS TO BEDS    Total # of Prescriptions Filled: 5   The following medications were delivered to the patient:  DULERA 200 - 5MCG/ACTAERO  ENTRESTO 49 - 51MG TABS  METOPROLOL SUCCINATE ER 50MG TB24  ALBUTEROL SULFATE  AERS  ELIQUIS 5MG TABS    Additional Documentation: Nancy MONSON approved to deliver medications to patient room=signed  [facility]

## 2024-04-10 NOTE — CONSULTS
[facility]      CONSULTATION  [phone]      Chief Complaint   Patient presents with    Abdominal Pain     Left lower side pain on/off for a year. Pt feels he may have a hernia. States a spot around his belly button is \"protruding\" and when he pushes it back in, it sends pain through his stomach.        Reason for consult:  new onset atrial fibrllation    ASSESSMENT AND PLAN:  PAF, apparently new onset, rate well-controlled, not currently on NOAC  Class 3 severe obesity with severe comorbidity BMI 55  HTN  Chronic systolic heart failure, doesn't appear decompensdated  Last LVEF 40%  Presumed nonischemic (no prior cath, when initially dx with morbid obesity weighed > 500 lbs and was too large to have a heart cath)  History of \"blood clots\"  Leg Doppler 2019 with superficial clot involving the right varicosity off the GSV zone 4 in the medial thigh.  Negative VQ scan 2021  Admitted for superficial leg venous thromophlebitis to  in 2013    I spent a long time discussing with patient what AF and cardiomyopathy are, as well as offering both inpatient versus outpatient evaluations.  He is stable for discharge home with close follow-up.    He is overall NYHA class II-III.  Although he is able to walk for a long period of time, it is at a very slow pace, and he gets winded if he tries to go faster or use an incline    Plan    Start Eliquis 5 BID (CHADS-VASC 2)  Start Entresto 49/51 BID  D/C amlodipine  Increase metoprolol XL to 50 daily    Will get 30 day monitor  Echo as outpatient ASAP   If LVEF still low will need  an ischemic workup   No signs/symptoms of ACS  today, can defer this to outpatient    Plan to bring back in about 1 month for an elective cardioversion    Discussed that he should  get an evaluation for SALVATORE    Continue working on weight loss    Thanks very much for the consult        History of Present Illness:  Nicolas WATERS Venancio is a 53 y.o. patient presents to the ER for evaluation for LLQ

## 2024-04-10 NOTE — ED PROVIDER NOTES
EMERGENCY DEPARTMENT PROVIDER NOTE    Patient Identification  Pt Name: Nicolas Craft  MRN: [id number]  Birthdate 1970  Date of evaluation: 4/10/2024  Provider: Naresh Funk DO  PCP: La Franks, APRN - CNP    Chief Complaint  Abdominal Pain (Left lower side pain on/off for a year. Pt feels he may have a hernia. States a spot around his belly button is \"protruding\" and when he pushes it back in, it sends pain through his stomach.)      HPI  (History provided by patient)  This is a 53 y.o. male with pertinent past medical history of asthma, GERD, HTN who was brought in by self for abdominal pain.  Patient reports pain ongoing off and on for the past year or so, has noticed a spot around his bellybutton which protrudes and is concerned he has a hernia.  Nothing clearly makes the symptoms any better or worse.  He does report aching pain around his bellybutton which occasionally migrates into his left-sided abdomen.  Denies any fevers, chills, chest pain, shortness of breath or  symptoms.    Patient also reports has run out of his albuterol and Dulera inhalers and requesting refills.      I have reviewed the following nursing documentation:  Allergies: Patient has no known allergies.    Past medical history:   Past Medical History:   Diagnosis Date    Asthma     Depression     Hx of blood clots     Hypertension      Past surgical history:   Past Surgical History:   Procedure Laterality Date    INCOMPLETE CIRCUMCISION REPAIR         Home medications:   Discharge Medication List as of 4/10/2024 12:20 PM        CONTINUE these medications which have NOT CHANGED    Details   naproxen (NAPROSYN) 500 MG tablet Take 1 tablet by mouth 2 times daily (with meals), Disp-60 tablet, R-0Normal             Social history:  reports that he quit smoking about 21 years ago. His smoking use included cigarettes. He started smoking about 32 years ago. He has a 2.8 pack-year smoking history. He quit smokeless tobacco use 
negative - no cough

## 2024-04-10 NOTE — TELEPHONE ENCOUNTER
----- Message from Gabbie Hamilton, ERMIAS sent at 4/10/2024 10:22 AM EDT -----  Regarding: cardioversion  Per Dr Lepe, he would like pt to come in for cardioversion in about a month. Are you able to schedule that? I copied his RN as well.     Thank you,   Gabbie

## 2024-04-11 ENCOUNTER — CARE COORDINATION (OUTPATIENT)
Dept: CARE COORDINATION | Age: 54
End: 2024-04-11

## 2024-04-11 ENCOUNTER — TELEPHONE (OUTPATIENT)
Dept: CARDIOLOGY CLINIC | Age: 54
End: 2024-04-11

## 2024-04-11 LAB
EST. AVERAGE GLUCOSE BLD GHB EST-MCNC: 88.2 MG/DL
HBA1C MFR BLD: 4.7 %

## 2024-04-11 NOTE — CARE COORDINATION
ACM called related to ED follow up, but patient did not answer.  ACM left message for patient to call back and left call back number.  Will follow up at a later date.

## 2024-04-11 NOTE — TELEPHONE ENCOUNTER
----- Message from Ollie Lepe MD sent at 4/11/2024  4:32 PM EDT -----  Please let patient know he is not diabetic.  Will review labs in detail at next OV

## 2024-04-11 NOTE — TELEPHONE ENCOUNTER
Pt is scheduled for an echo at [facility] on 4/22/24 and will keep appt on 5/10/24 with Dr. Lepe at [facility].  There were no echoes in [facility] until 6/27 and patient requested soonest appt anywhere.  jh

## 2024-04-12 ENCOUNTER — CARE COORDINATION (OUTPATIENT)
Dept: CARE COORDINATION | Age: 54
End: 2024-04-12

## 2024-04-12 NOTE — TELEPHONE ENCOUNTER
Dr. Lepe doesn't have anything available until June at [facility]. I spoke to ERMIAS Ocasio and she spoke to Dr. Lepe, she stated that Dr. Lepe said he can either do it at [place] [place] in May, or have another provider do it in May or if Nicolas wants to wait, then June would be fine.     I called and spoke to Nicolas to schedule his CV in a month and he said he has no idea what I was talking about. He said the only thing he know of is his ECHO. He is wanting to know why he needs a CV. I told him I would put a message back and have either the nurse or Dr. Lepe reach out to him. He states to call him in the afternoon, because he will be sleeping in the next couple of hours.

## 2024-04-12 NOTE — CARE COORDINATION
ACM called related to ED follow up, but patient did not answer x2.  ACM left message for patient to call back and left call back number.  Will follow up at a later date.

## 2024-04-15 ENCOUNTER — CARE COORDINATION (OUTPATIENT)
Dept: CARE COORDINATION | Age: 54
End: 2024-04-15

## 2024-04-15 ENCOUNTER — TELEPHONE (OUTPATIENT)
Dept: CARDIOLOGY CLINIC | Age: 54
End: 2024-04-15

## 2024-04-15 DIAGNOSIS — I48.91 ATRIAL FIBRILLATION, UNSPECIFIED TYPE (HCC): Primary | ICD-10-CM

## 2024-04-15 NOTE — TELEPHONE ENCOUNTER
Pt states starting on Friday night he began to dizzy, lightheaded, and an overall \"shake in his equilibrium\" while working at his computer with headphones on. Pt states it got to the point where he thought he would pass out. Patient notes this only happens when using headphones or his computer. PT wants to know if this a side effect of the medications he was prescribed.     Please advise.

## 2024-04-15 NOTE — TELEPHONE ENCOUNTER
Date of Procedure: Wednesday 4/17/24 @ [place]    Time of arrival: 7:00 am     Procedure time: 8:00 am    Called and spoke to Nicolas and he is agreeable to date and time. Reviewed instructions and he verbalized understanding. Encouraged to call with any questions or concerns.       Published on [employer] and e-mail to Aruna.

## 2024-04-15 NOTE — CARE COORDINATION
Ambulatory Care Coordination  ED Follow up Call    Reason for ED visit:  abdominal pain    Status:     not changed    Did you call your PCP prior to going to the ED?  No     Did you receive a discharge instructions from the Emergency Room? Yes  Review of Instructions:     Understands what to report/when to return?:  Yes   Understands discharge instructions?:  Yes   Following discharge instructions?:  Yes   If not why?     Are there any new complaints of pain? No  New Pain Meds? No    Constipation prophylaxis needed?  N/A    If you have a wound is the dressing clean, dry, and intact? N/A  Understands wound care regimen? N/A    Are there any other complaints/concerns that you wish to tell your provider?   No    FU appts/Provider:    Future Appointments   Date Time Provider Department Center   4/22/2024  9:30 AM ECHO ONLY ROOM 1 - [facility] ECHO [facility]   5/10/2024  9:30 AM Ollie Lepe MD [place] [facility]     ACM called patient regarding ED follow up. Patient kept conversation brief. Stated that he has been busy taking care of his aging parents and neglected his own health. Dad recently passed away. Currently between PCP's at this time. Declined phone number for Find a Physician Line. Reviewed AVS. No questions. Has an echo and cardiology appointment scheduled. Reported he called cardiology this morning and already spent 10 minutes on the phone with them. Waiting for return call at this time. Declined care coordination services.     New Medications?:   Yes      Medication Reconciliation by phone - No  Understands Medications?  Yes  Taking Medications? Yes  Can you swallow your pills?  Yes    Any further needs in the home i.e. Equipment?  No    Link to services in community?:  No

## 2024-04-15 NOTE — TELEPHONE ENCOUNTER
Per JUVENCIO -   Persistent atrial fib   Has been taking Eliquis     Needs a RONY since < 3 weeks on Eliquis     After discussion patient wants to get this done ASAP - I'll do it tomorrow at [place] (or Wednesday if necessary).  Those are my only 2 days at [place]     JUVENCIO called and spoke directly to  Venancio and instructions given.      Orders placed for RONY and CV

## 2024-04-16 NOTE — PROGRESS NOTES
Pre-procedure phone call completed. Confirmed 0700 time of arrival, NPO status, medications to be taken and verified pt has a ride home. Pt verbalized understanding.

## 2024-04-17 ENCOUNTER — HOSPITAL ENCOUNTER (OUTPATIENT)
Age: 54
Discharge: HOME | End: 2024-04-19
Payer: MEDICAID

## 2024-04-17 ENCOUNTER — HOSPITAL ENCOUNTER (OUTPATIENT)
Age: 54
Discharge: HOME | End: 2024-04-17
Payer: MEDICAID

## 2024-04-17 ENCOUNTER — TELEPHONE (OUTPATIENT)
Age: 54
End: 2024-04-17

## 2024-04-17 VITALS
SYSTOLIC BLOOD PRESSURE: 97 MMHG | DIASTOLIC BLOOD PRESSURE: 69 MMHG | RESPIRATION RATE: 17 BRPM | OXYGEN SATURATION: 100 %

## 2024-04-17 VITALS
BODY MASS INDEX: 39.17 KG/M2 | WEIGHT: 315 LBS | HEART RATE: 71 BPM | OXYGEN SATURATION: 98 % | DIASTOLIC BLOOD PRESSURE: 59 MMHG | HEIGHT: 75 IN | SYSTOLIC BLOOD PRESSURE: 100 MMHG | RESPIRATION RATE: 18 BRPM

## 2024-04-17 PROBLEM — I48.91 ATRIAL FIBRILLATION (HCC): Status: ACTIVE | Noted: 2024-04-17

## 2024-04-17 LAB
ALBUMIN SERPL-MCNC: 4.1 G/DL (ref 3.4–5)
ALBUMIN/GLOB SERPL: 1.7 {RATIO}
ALP SERPL-CCNC: 60 U/L (ref 40–129)
ALT SERPL-CCNC: 20 U/L (ref 10–40)
ANION GAP SERPL CALCULATED.3IONS-SCNC: 12 MMOL/L (ref 3–16)
AST SERPL-CCNC: 28 U/L (ref 15–37)
BASOPHILS # BLD: 0.03 K/UL (ref 0–0.2)
BASOPHILS NFR BLD: 0 %
BILIRUB SERPL-MCNC: 1.2 MG/DL (ref 0–1)
BNP SERPL-MCNC: 430 PG/ML (ref 0–124)
BUN SERPL-MCNC: 18 MG/DL (ref 7–20)
CALCIUM SERPL-MCNC: 9.1 MG/DL (ref 8.3–10.6)
CHLORIDE SERPL-SCNC: 104 MMOL/L (ref 99–110)
CO2 SERPL-SCNC: 22 MMOL/L (ref 21–32)
CREAT SERPL-MCNC: 1.2 MG/DL (ref 0.7–1.8)
EOSINOPHIL # BLD: 0.22 K/UL (ref 0–0.6)
EOSINOPHILS RELATIVE PERCENT: 3 %
ERYTHROCYTE [DISTWIDTH] IN BLOOD BY AUTOMATED COUNT: 12.7 % (ref 12.4–15.4)
GFR SERPL CREATININE-BSD FRML MDRD: 71 ML/MIN/1.73M2
GLUCOSE SERPL-MCNC: 94 MG/DL (ref 70–99)
HCT VFR BLD AUTO: 43.9 % (ref 40.5–52.5)
HGB BLD-MCNC: 15.3 G/DL (ref 13.5–17.5)
IMM GRANULOCYTES # BLD AUTO: 0.02 K/UL (ref 0–0.5)
IMM GRANULOCYTES NFR BLD: 0 %
INR PPP: 1.2 (ref 0.9–1.2)
LYMPHOCYTES NFR BLD: 2.44 K/UL (ref 1–5.1)
LYMPHOCYTES RELATIVE PERCENT: 29 %
MCH RBC QN AUTO: 30.4 PG (ref 26–34)
MCHC RBC AUTO-ENTMCNC: 34.9 G/DL (ref 31–36)
MCV RBC AUTO: 87.3 FL (ref 80–100)
MONOCYTES NFR BLD: 0.69 K/UL (ref 0–1.3)
MONOCYTES NFR BLD: 8 %
NEUTROPHILS NFR BLD: 59 %
NEUTS SEG NFR BLD: 4.92 K/UL (ref 1.7–7.7)
PARTIAL THROMBOPLASTIN TIME: 31.7 SEC (ref 22.1–36.4)
PLATELET # BLD AUTO: 330 K/UL (ref 135–450)
PMV BLD AUTO: 9.9 FL (ref 9.4–12.4)
POTASSIUM SERPL-SCNC: 4 MMOL/L (ref 3.5–5.1)
PROT SERPL-MCNC: 6.5 G/DL (ref 6.4–8.2)
PROTHROMBIN TIME: 15.8 SEC (ref 11.9–14.9)
RBC # BLD AUTO: 5.03 M/UL (ref 4.2–5.9)
SODIUM SERPL-SCNC: 138 MMOL/L (ref 136–145)
WBC OTHER # BLD: 8.3 K/UL (ref 4–11)

## 2024-04-17 PROCEDURE — 83880 ASSAY OF NATRIURETIC PEPTIDE: CPT

## 2024-04-17 PROCEDURE — 2500000003 HC RX 250 WO HCPCS: Performed by: INTERNAL MEDICINE

## 2024-04-17 PROCEDURE — 80053 COMPREHEN METABOLIC PANEL: CPT

## 2024-04-17 PROCEDURE — 85025 COMPLETE CBC W/AUTO DIFF WBC: CPT

## 2024-04-17 PROCEDURE — 36415 COLL VENOUS BLD VENIPUNCTURE: CPT

## 2024-04-17 PROCEDURE — 92960 CARDIOVERSION ELECTRIC EXT: CPT | Performed by: INTERNAL MEDICINE

## 2024-04-17 PROCEDURE — 6370000000 HC RX 637 (ALT 250 FOR IP): Performed by: INTERNAL MEDICINE

## 2024-04-17 PROCEDURE — 99152 MOD SED SAME PHYS/QHP 5/>YRS: CPT | Performed by: INTERNAL MEDICINE

## 2024-04-17 PROCEDURE — 93312 ECHO TRANSESOPHAGEAL: CPT | Performed by: INTERNAL MEDICINE

## 2024-04-17 PROCEDURE — C8929 TTE W OR WO FOL WCON,DOPPLER: HCPCS

## 2024-04-17 PROCEDURE — 85610 PROTHROMBIN TIME: CPT

## 2024-04-17 PROCEDURE — 92961 CARDIOVERSION ELECTRIC INT: CPT | Performed by: INTERNAL MEDICINE

## 2024-04-17 PROCEDURE — 6360000004 HC RX CONTRAST MEDICATION: Performed by: INTERNAL MEDICINE

## 2024-04-17 PROCEDURE — 85730 THROMBOPLASTIN TIME PARTIAL: CPT

## 2024-04-17 PROCEDURE — 6360000002 HC RX W HCPCS: Performed by: INTERNAL MEDICINE

## 2024-04-17 RX ORDER — MIDAZOLAM HYDROCHLORIDE 1 MG/ML
1 INJECTION INTRAMUSCULAR; INTRAVENOUS ONCE
Status: COMPLETED | OUTPATIENT
Start: 2024-04-17 | End: 2024-04-17

## 2024-04-17 RX ORDER — ESMOLOL HYDROCHLORIDE 10 MG/ML
100 INJECTION INTRAVENOUS ONCE
Status: DISCONTINUED | OUTPATIENT
Start: 2024-04-17 | End: 2024-04-17

## 2024-04-17 RX ORDER — FENTANYL CITRATE 50 UG/ML
100 INJECTION, SOLUTION INTRAMUSCULAR; INTRAVENOUS ONCE
Status: COMPLETED | OUTPATIENT
Start: 2024-04-17 | End: 2024-04-17

## 2024-04-17 RX ORDER — METHOCARBAMOL 100 MG/ML
INJECTION, SOLUTION INTRAMUSCULAR; INTRAVENOUS
Status: DISCONTINUED
Start: 2024-04-17 | End: 2024-04-17 | Stop reason: WASHOUT

## 2024-04-17 RX ADMIN — BENZOCAINE: 200 SPRAY DENTAL; ORAL; PERIODONTAL at 08:34

## 2024-04-17 RX ADMIN — FENTANYL CITRATE 100 MCG: 50 INJECTION INTRAMUSCULAR; INTRAVENOUS at 09:04

## 2024-04-17 RX ADMIN — MIDAZOLAM 1 MG: 1 INJECTION INTRAMUSCULAR; INTRAVENOUS at 09:07

## 2024-04-17 RX ADMIN — METHOHEXITAL SODIUM 40 MG: 500 INJECTION, POWDER, LYOPHILIZED, FOR SOLUTION INTRAMUSCULAR; INTRAVENOUS; RECTAL at 09:19

## 2024-04-17 RX ADMIN — MIDAZOLAM 1 MG: 1 INJECTION INTRAMUSCULAR; INTRAVENOUS at 09:03

## 2024-04-17 RX ADMIN — PERFLUTREN 1.5 ML: 6.52 INJECTION, SUSPENSION INTRAVENOUS at 09:45

## 2024-04-17 ASSESSMENT — PAIN SCALES - GENERAL
PAINLEVEL_OUTOF10: 0

## 2024-04-17 ASSESSMENT — PAIN - FUNCTIONAL ASSESSMENT: PAIN_FUNCTIONAL_ASSESSMENT: NONE - DENIES PAIN

## 2024-04-17 NOTE — PROGRESS NOTES
Pt is alert and oriented x4, moving all extremities appropriately. Pt is able to ambulate independently to restroom and back to stretcher. Verified pt didn't have obstacles at home with medication compliance and getting around within his home. IV removed without complications. Encouraged patient follow up care with cardiology and primary care provider.

## 2024-04-17 NOTE — PROCEDURES
DIRECT CURRENT CARDIOVERSION REPORT      Full RONY report to be finalized in [facility].  Brief RONY report:  RONY performed w/o complications      Anesthesia: 1% viscous lidocaine, cetacaine spray   Sedation: 2 mg IV versed   Analgesia: 50 mcg IV fentanyl     Probe inserted easily and removed easily   Findings:      -LVEF estimated 30-35%  -Mild-to-moderate MR  -Dilated ventricle  -No LEANNA thrombujs     Complications: none   EBL: none   Specimens: N/A       CONSCIOUS SEDATION: Conscious sedation was given. The medication documented above was administered during the study.There was a trained observer present throughout the   entire time during which sedation was administered. Moderate sedation time   was 15 minutes.       ----------------------  Cardioversion procedure note:       Informed consent explained to patient and consent form signed by patient (prior to sedating for RONY)      Pads placed in appropraite position, site confirmed   Time-out performed prior to initiation of procedure.       Presenting rhyhm: atrial fibrillation   Sedation: 40 mg IV methohexital pushed by Dr. Lepe   # of attempts: 1 - 200 J       Results: successful conversion to normal sinus rhythm       Aftter cardioversion, limited echo with Definity was performed.  LVEF 35-40%, distal LAD wall-motion abnormalities      Plan  -Patient to continue to wear 30-day MCOT until 30 days are up to monitor for recurrence  -Continue current doses of entresto and metoprolol XL  -Continue Eliquis    -If patient has new symptomatic atrial fibrillation patient to call us and we will determine whether to start amiodarone, get a stat EP referral, or return to hospital    -He will likely benefit from an AF ablation.  Given low LVEF and WMA need to exclude and treat significant coronary disease first      -Will cancel planned surface echo at [place]  -Patient will need ischemic workup as an outpatient soon    -F/U as scheduled in office with Dr. Lepe

## 2024-04-17 NOTE — TELEPHONE ENCOUNTER
Please call patient and see if he is willing to move his 5/10 appt up to tomorrow or Friday with [facility] at [place], per JUVENCIO. He does not need an echo with this visit anymore. Thank you!    Problem: Knowledge Deficit  Goal: *Verbalizes understanding of procedures and medications  Outcome: Progressing Towards Goal     Problem: Patient Education:  Go to Education Activity  Goal: Patient/Family Education  Outcome: Progressing Towards Goal

## 2024-04-17 NOTE — TELEPHONE ENCOUNTER
----- Message from Ollie Lepe MD sent at 4/17/2024  9:47 AM EDT -----  Regarding: cardioversion successful - need to cancel echo  Ariana    We did a limited surface echo so please cancel his 4/22 echo at [place] and [facility].    See if we can [facility] - I want to get him on Wegovy (should be able to have insurance cover it) and also discuss getting a cath and CTA.  I'd rather discuss more in person than just order it since he was just sedated.    Thanks    mary

## 2024-04-17 NOTE — PROGRESS NOTES
Introduced self to patient. Pt ambulated from waiting room to Pre/Post cath lab holding bay 6. IV started without complications, pt tolerated well. 20g in right hand. Blood work collected and sent to lab. IV flushed easily. EKG completed by this RN patient currently in Atrial Fibrillation with occasional PVC. Pt is alert and oriented x4. Pt verbalized good understanding of procedure today.

## 2024-04-17 NOTE — PRE SEDATION
Sedation Pre-Procedure Note    Patient Name: Nicolas Craft   YOB: 1970  Room/Bed: Room/bed info not found  Medical Record Number: [id number]  Date: 4/17/2024   Time: 7:54 AM       Indication:  Mod sedaton for RONY  / Deep sedation for cardioversion    Consent: I have discussed with the patient and/or the patient representative the indication, alternatives, and the possible risks and/or complications of the planned procedure and the anesthesia methods. The patient and/or patient representative appear to understand and agree to proceed.    Vital Signs:   There were no vitals filed for this visit.    Past Medical History:   has a past medical history of Asthma, Depression, Hx of blood clots, and Hypertension.    Past Surgical History:   has a past surgical history that includes incomplete circumcision repair.    Medications:   Scheduled Meds:   Continuous Infusions:   PRN Meds:   Home Meds:   Prior to Admission medications    Medication Sig Start Date End Date Taking? Authorizing Provider   apixaban (ELIQUIS) 5 MG TABS tablet Take 1 tablet by mouth 2 times daily 4/10/24 5/10/24  Naresh Funk,    albuterol sulfate HFA (PROVENTIL;VENTOLIN;PROAIR) 108 (90 Base) MCG/ACT inhaler Inhale 1 puff into the lungs every 6 hours as needed for Wheezing 4/10/24   Naresh Funk,    metoprolol succinate (TOPROL XL) 50 MG extended release tablet Take 1 tablet by mouth daily 4/10/24 5/10/24  Naresh Funk DO   mometasone-formoterol (DULERA) 200-5 MCG/ACT inhaler INHALE 2 PUFFS INTO THE LUNGS TWICE DAILY 4/10/24   Naresh Funk,    sacubitril-valsartan (ENTRESTO) 49-51 MG per tablet Take 1 tablet by mouth 2 times daily 4/10/24   Naresh Funk, DO   naproxen (NAPROSYN) 500 MG tablet Take 1 tablet by mouth 2 times daily (with meals) 2/27/24   Sanjeev Chino, HEATH     Coumadin Use Last 7 Days:  no  Antiplatelet drug therapy use last 7 days: yes - apixaban  Other anticoagulant use last 7 days:

## 2024-04-17 NOTE — DISCHARGE INSTRUCTIONS
[facility] [place]  Cardiovascular Special Procedures  Transesophageal Echocardiogram  Patient Discharge Instructions      Rest for the remainder of the day.  Your throat may be sore, this is common and will go away. If persistent soreness continues, call your physician.  If you have any difficulty in swallowing or drinking/eating, call your physician.    Resume normal activity tomorrow.        [facility]  Cardiovascular Special Procedures  General Discharge Instructions    PROCEDURE: ______Transesophageal Echocardiogram_____    __X__ You may be drowsy or lightheaded after receiving sedation. DO NOT operate a vehicle (automobile, bicycle, motorcycle, machinery, or power tools), make any important decisions or sign any important/legal documents, or drink alcoholic beverages for the next 24 hours  __X__ We strongly suggest that a responsible adult be with you for the next 24 hours for your protection and safety  __X__ If the intravenous catheter site is painful, apply warm wet compresses on the site until the soreness is relieved and elevate the arm above the heart.  Call your physician if no improvement in 2 to 3 days    DIETARY INSTRUCTIONS:    ____ Drink extra fluids over the next 24 hours (If not contraindicated by illness or by                   physician order)  ____ Start with clear liquids and progress to normal diet as you feel like eating.  If you experience nausea or repeated episodes of vomiting, which persist beyond 12-24 hours, notify your doctor        __X_ Resume your previous diet      MEDICATION INSTRUCTIONS:    __X__ See Medication Reconciliation Sheet      SPECIAL INSTRUCTIONS:  ________________________________________________________________________________________________________________________________________________________________________________________________________________________

## 2024-04-17 NOTE — PATIENT INSTRUCTIONS
[facility] [place]  Cardiovascular Special Procedures  Transesophageal Echocardiogram  Patient Discharge Instructions      Rest for the remainder of the day.  Your throat may be sore, this is common and will go away. If persistent soreness continues, call your physician.  If you have any difficulty in swallowing or drinking/eating, call your physician.    Resume normal activity tomorrow.        [facility]  Cardiovascular Special Procedures  General Discharge Instructions    PROCEDURE: ______Transesophageal Echocardiogram_____    ____ You may be drowsy or lightheaded after receiving sedation. DO NOT operate a vehicle (automobile, bicycle, motorcycle, machinery, or power tools), make any important decisions or sign any important/legal documents, or drink alcoholic beverages for the next 24 hours  ____ We strongly suggest that a responsible adult be with you for the next 24 hours for your protection and safety  ____ If the intravenous catheter site is painful, apply warm wet compresses on the site until the soreness is relieved and elevate the arm above the heart.  Call your physician if no improvement in 2 to 3 days    DIETARY INSTRUCTIONS:    ____ Drink extra fluids over the next 24 hours (If not contraindicated by illness or by                   physician order)  ____ Start with clear liquids and progress to normal diet as you feel like eating.  If you experience nausea or repeated episodes of vomiting, which persist beyond 12-24 hours, notify your doctor        ____ Resume your previous diet      MEDICATION INSTRUCTIONS:    ____ See Medication Reconciliation Sheet      SPECIAL INSTRUCTIONS:  ________________________________________________________________________________________________________________________________________________________________________________________________________________________

## 2024-04-17 NOTE — TELEPHONE ENCOUNTER
LVM for pt to call back and move appt at [facility] with Dr. Lepe up from 5/10/24 to tomorrow 4/18 or Friday 4/19 at [place].  See previous message.  pjp

## 2024-04-17 NOTE — H&P
Pre-Procedure H  & P for RONY/Cardioversion    Full H & P done by me at [facility] on 4/10/2024    Patient seen/examined today in detail today.    Interval history update: on 4/10/2024 patient had apparently new-onset rate-controlled AF of unknown duration.  He was minimally symptomatic from the AF and had actually gone to the ER for evaluation of abominal pain.    After discussion, we planned to add beta blocker for rate control, start NOAC for at least 3 weeks, and wear an MCOT with plans to cardiovert after 3 weeks of NOAC in order to avoid the need for RONY; with the option of not doing a cardioversion at all if he was not sustaining AF.    Since that visit, he has had 100% AF on MCOT with some HR as high as 150 bpm, and he has been very symptomatic, reporting significant flutters, palpitations, and a general sense of jitteriness.    After discussion, he agreed to be scheduled for an ASAP cardioversion, requiring a RONY since he has not been anticoagulated long enough to safely assume there is no clot.    VITALS    BP 96/58  HR 90  95% RA    General Appearance:  Alert, cooperative, no distress, appears stated age, morbidly obese   Head:  Normocephalic, without obvious abnormality, atraumatic   Eyes:  Conjunctiva/corneas clear   Nose: Nares normal, no drainage or sinus tenderness   Throat: Lips, mucosa, and tongue normal.  Mallampati grade IV airway.   Neck: Supple, symmetrical, tUnable to see jugular veins thus unable to assess for JVD   Lungs:   Clear to auscultation bilaterally, respirations unlabored   Chest Wall:  No tenderness or deformity   Heart:  Irregular rate and rhythm, S2 normal, no murmur, rub or gallop   Abdomen:   Reducible umbilical hernia.   Extremities: Extremities normal, atraumatic, no cyanosis or edema   Pulses: 2+ and symmetric   Skin: Skin color, texture, turgor normal, no rashes or lesions   Psych: Normal mood and affect   Neurologic: No focal deficit      Labs from last week reviewed:

## 2024-04-17 NOTE — PROGRESS NOTES
CATH LAB PROCEDURE LOG - TRANSESOPHAGEAL ECHOCARDIOGRAM, POSSIBLE CARDIOVERSION    PRE PROCEDURE    DATE: 4/17/2024 ARRIVAL TO CATH LAB: 7:57 AM    ID & ALLERGY BAND: On    CONSENT: Yes       NPO SINCE: Midnight     IV SITE : Right hand with fluids infusing at 75 7:57 AM     Pt arrived to Cath Lab.   Plan of Care: Hemodynamics and cardiac rhythm will remain stable. Comfort level will be maintained. Respiratory function will remain adequate. Pt/family will verbalize understanding of the procedure. Procedure will be tolerated without complications. Patient will recover from procedure without complications.   ID armband on patient and identification verified.   Informed consent obtained.   Non invasive blood pressure cuff applied, monitoring initiated.   EKG pads and pulse oximeter applied, monitoring initiated.   Instructions given. Patient and / or family verbalize understanding.   H&P will be documented by physician in Epic.   Pt has been NPO since midnight.     Pre CV Rhythm: Atrial Fibrillation    Defibrillator pads applied to chest.       TRANSESOPHAGEAL ECHOCARDIOGRAM    Timeout and fire safety completed.     TIMEOUT TIME: 9:03 AM    CORRECT PATIENT VERIFIED. MEMBERS OF THE SURGICAL TEAM/VISITORS INTRODUCED. ALLERGIES ANNOUNCED. CORRECT PROCEDURE VERIFIED. CORRECT PROCEDURAL SITE VERIFIED. CONSENTS VERIFIED. IMPLANT EQUIPMENT, ADDITIONAL SERVICES, SPECIAL REQUIREMENTS AVAILABLE. MEDICATIONS LABELED AND AVAILABLE. APPROPRIATE PRE MEDS HAVE BEEN ADMINISTERED.    FIRE SAFETY: ALCOHOL PREP SOLUTION HAD SUFFICIENT TIME TO DISSIPATE IF USED. FIRE SAFETY: SURGICAL SITE OR INCISION ABOVE THE XIPHOID. YES=1, NO=0. FIRE SAFETY: OPEN OXYGEN SOURCE. YES=1, NO=0. FIRE SAFETY: AVAILABLE IGNITION SOURCE. YES=1, NO=0. FIRE SAFETY: SCORE TOTAL = 1.     Benzocaine 20% oral spray administered: Yes  0834      Cetacaine spray administered: No      Procedural sedation administered per protocol via Dr. Lepe    Medications verified by

## 2024-04-17 NOTE — DISCHARGE INSTR - DIET

## 2024-04-18 ENCOUNTER — TELEPHONE (OUTPATIENT)
Dept: CARDIOLOGY CLINIC | Age: 54
End: 2024-04-18

## 2024-04-18 DIAGNOSIS — I25.5 ISCHEMIC CARDIOMYOPATHY: Primary | ICD-10-CM

## 2024-04-18 NOTE — TELEPHONE ENCOUNTER
CTA ordered. JUVENCIO called patient and discussed, he advised patient on taking medication and bringing his bottle with him to the test, CS# given for pt to call and schedule. Will leave his appt for 5//10 as planned. Pt v/u, no further questions at this time.

## 2024-04-18 NOTE — TELEPHONE ENCOUNTER
Called pt to move up appointment at [facility] to tomorrow 4/19.  Patient was concerned as to why appointment needs to be moved up since he had a RONY/CV yesterday and wanted to know if anything was seen on his monitor.  Please call patient and advise.  jh

## 2024-04-18 NOTE — TELEPHONE ENCOUNTER
----- Message from Ollie Lepe MD sent at 4/17/2024  9:47 AM EDT -----  Regarding: cardioversion successful - need to cancel echo  Ariana    We did a limited surface echo so please cancel his 4/22 echo at [place] and also his [facility] echo.    See if we can move his follow-up at [facility] sooner - I want to get him on Wegovy (should be able to have insurance cover it) and also discuss getting a cath and CTA.  I'd rather discuss more in person than just order it since he was just sedated.    Thanks    mary

## 2024-04-18 NOTE — TELEPHONE ENCOUNTER
----- Message from Ollie Lepe MD sent at 4/17/2024  9:47 AM EDT -----  Regarding: cardioversion successful - need to cancel echo  Ariana    We did a limited surface echo so please cancel his 4/22 echo at [place] and also his [facility] echo.    See if we can move his follow-up at [facility] sooner - I want to get him on Wegovy (should be able to have insurance cover it) and also discuss getting a cath and CTA.  I'd rather discuss more in person than just order it since he was just sedated.    Thanks    mary

## 2024-04-22 ENCOUNTER — HOSPITAL ENCOUNTER (OUTPATIENT)
Dept: NON INVASIVE DIAGNOSTICS | Age: 54
Discharge: HOME | End: 2024-04-22
Attending: INTERNAL MEDICINE

## 2024-04-24 ENCOUNTER — HOSPITAL ENCOUNTER (OUTPATIENT)
Age: 54
Discharge: HOME | End: 2024-04-24
Attending: INTERNAL MEDICINE
Payer: MEDICAID

## 2024-04-24 VITALS — RESPIRATION RATE: 16 BRPM | DIASTOLIC BLOOD PRESSURE: 87 MMHG | HEART RATE: 63 BPM | SYSTOLIC BLOOD PRESSURE: 138 MMHG

## 2024-04-24 DIAGNOSIS — I25.5 ISCHEMIC CARDIOMYOPATHY: ICD-10-CM

## 2024-04-24 DIAGNOSIS — R94.30 LOW LEFT VENTRICULAR EJECTION FRACTION: ICD-10-CM

## 2024-04-24 PROCEDURE — 75574 CT ANGIO HRT W/3D IMAGE: CPT

## 2024-04-24 PROCEDURE — 75580 N-INVAS EST C FFR SW ALY CTA: CPT

## 2024-04-24 PROCEDURE — 6360000004 HC RX CONTRAST MEDICATION: Performed by: INTERNAL MEDICINE

## 2024-04-24 PROCEDURE — 6370000000 HC RX 637 (ALT 250 FOR IP): Performed by: RADIOLOGY

## 2024-04-24 RX ORDER — NITROGLYCERIN 0.4 MG/1
0.4 TABLET SUBLINGUAL ONCE
Status: COMPLETED | OUTPATIENT
Start: 2024-04-24 | End: 2024-04-24

## 2024-04-24 RX ADMIN — IOPAMIDOL 100 ML: 755 INJECTION, SOLUTION INTRAVENOUS at 10:42

## 2024-04-24 RX ADMIN — NITROGLYCERIN 0.4 MG: 0.4 TABLET SUBLINGUAL at 10:29

## 2024-04-25 ENCOUNTER — TELEPHONE (OUTPATIENT)
Dept: CARDIOLOGY CLINIC | Age: 54
End: 2024-04-25

## 2024-04-25 DIAGNOSIS — R93.89 ABNORMAL COMPUTED TOMOGRAPHY ANGIOGRAPHY (CTA): Primary | ICD-10-CM

## 2024-04-25 NOTE — TELEPHONE ENCOUNTER
----- Message from Nayeli Nichols RN sent at 4/25/2024 10:49 AM EDT -----  Regarding: [facility]  Surya Hassan,     Please schedule for [facility] ASAP per JUVENCIO. Pt notified.     Left Heart Cath patient pre-procedure instructions- reviewed with patient     · Do NOT eat or drink anything after midnight morning of procedure    · MEDICATIONS:   o Your medications have been reviewed. Please follow medication instructions below   o It is okay to drink a sip of water with meds morning of procedure   o Eliquis-do not take for 48 hours (2 days) prior to procedure    · Transportation: Bring someone to drive you home.     · Scheduling: Sonya MESSER is our full time procedure . She will call you to schedule your procedure.     Thank you!     Angelia

## 2024-04-25 NOTE — TELEPHONE ENCOUNTER
Called pt and advised per [facility] message. [facility] orders placed. Message sent to      Left Heart Cath patient pre-procedure instructions- reviewed with patient     Do NOT eat or drink anything after midnight morning of procedure    MEDICATIONS:  Your medications have been reviewed. Please follow medication instructions below  It is okay to drink a sip of water with meds morning of procedure  Eliquis-do not take for 48 hours (2 days) prior to procedure    Transportation: Bring someone to drive you home.     Scheduling: Sonya MESSER is our full time procedure . She will call you to schedule your procedure.

## 2024-04-25 NOTE — TELEPHONE ENCOUNTER
Date of Procedure: Tuesday 4/30/24 @ [facility] with Dr. Rivas     Time of arrival: 9:30 am     Procedure time: 11:00 am     Called and spoke to Nicolas and he is agreeable to date and time. Reviewed instructions and he verbalized understanding. Encouraged to call with any questions or concerns.     Published on [facility], scheduled in Epic and e-mailed to cath lab.

## 2024-04-25 NOTE — TELEPHONE ENCOUNTER
----- Message from Ollie Lepe MD sent at 4/25/2024 10:23 AM EDT -----  Abnormal coronary CTA with low LVEF and vulnerable plaque  - he needs a cath ASAP

## 2024-04-30 ENCOUNTER — HOSPITAL ENCOUNTER (OUTPATIENT)
Dept: CARDIAC CATH/INVASIVE PROCEDURES | Age: 54
Discharge: HOME | End: 2024-04-30
Attending: INTERNAL MEDICINE | Admitting: INTERNAL MEDICINE
Payer: MEDICAID

## 2024-04-30 VITALS
TEMPERATURE: 98.1 F | OXYGEN SATURATION: 99 % | DIASTOLIC BLOOD PRESSURE: 105 MMHG | HEART RATE: 53 BPM | BODY MASS INDEX: 39.17 KG/M2 | SYSTOLIC BLOOD PRESSURE: 154 MMHG | WEIGHT: 315 LBS | RESPIRATION RATE: 16 BRPM | HEIGHT: 75 IN

## 2024-04-30 DIAGNOSIS — G47.33 OSA (OBSTRUCTIVE SLEEP APNEA): Primary | ICD-10-CM

## 2024-04-30 PROBLEM — R93.1 ABNORMAL CARDIAC CT ANGIOGRAPHY: Status: ACTIVE | Noted: 2024-04-30

## 2024-04-30 LAB
ANION GAP SERPL CALCULATED.3IONS-SCNC: 10 MMOL/L (ref 3–16)
BUN SERPL-MCNC: 16 MG/DL (ref 7–20)
CALCIUM SERPL-MCNC: 9.3 MG/DL (ref 8.3–10.6)
CHLORIDE SERPL-SCNC: 105 MMOL/L (ref 99–110)
CO2 SERPL-SCNC: 23 MMOL/L (ref 21–32)
CREAT SERPL-MCNC: 0.7 MG/DL (ref 0.9–1.3)
DEPRECATED RDW RBC AUTO: 13.4 % (ref 12.4–15.4)
EKG ATRIAL RATE: 63 BPM
EKG DIAGNOSIS: NORMAL
EKG P AXIS: 37 DEGREES
EKG P-R INTERVAL: 174 MS
EKG Q-T INTERVAL: 420 MS
EKG QRS DURATION: 116 MS
EKG QTC CALCULATION (BAZETT): 429 MS
EKG R AXIS: -17 DEGREES
EKG T AXIS: 68 DEGREES
EKG VENTRICULAR RATE: 63 BPM
GFR SERPLBLD CREATININE-BSD FMLA CKD-EPI: >90 ML/MIN/{1.73_M2}
GLUCOSE SERPL-MCNC: 100 MG/DL (ref 70–99)
HCT VFR BLD AUTO: 43.6 % (ref 40.5–52.5)
HGB BLD-MCNC: 14.8 G/DL (ref 13.5–17.5)
LEFT VENTRICULAR EJECTION FRACTION MODE: NORMAL
LV EF: 55 %
MCH RBC QN AUTO: 30.6 PG (ref 26–34)
MCHC RBC AUTO-ENTMCNC: 34 G/DL (ref 31–36)
MCV RBC AUTO: 89.8 FL (ref 80–100)
PLATELET # BLD AUTO: 255 K/UL (ref 135–450)
PMV BLD AUTO: 8 FL (ref 5–10.5)
POTASSIUM SERPL-SCNC: 4.2 MMOL/L (ref 3.5–5.1)
RBC # BLD AUTO: 4.85 M/UL (ref 4.2–5.9)
SODIUM SERPL-SCNC: 138 MMOL/L (ref 136–145)
WBC # BLD AUTO: 7.5 K/UL (ref 4–11)

## 2024-04-30 PROCEDURE — C1769 GUIDE WIRE: HCPCS

## 2024-04-30 PROCEDURE — 85027 COMPLETE CBC AUTOMATED: CPT

## 2024-04-30 PROCEDURE — C1894 INTRO/SHEATH, NON-LASER: HCPCS

## 2024-04-30 PROCEDURE — 93005 ELECTROCARDIOGRAM TRACING: CPT | Performed by: INTERNAL MEDICINE

## 2024-04-30 PROCEDURE — 93458 L HRT ARTERY/VENTRICLE ANGIO: CPT | Performed by: INTERNAL MEDICINE

## 2024-04-30 PROCEDURE — 99153 MOD SED SAME PHYS/QHP EA: CPT

## 2024-04-30 PROCEDURE — 99152 MOD SED SAME PHYS/QHP 5/>YRS: CPT

## 2024-04-30 PROCEDURE — 36415 COLL VENOUS BLD VENIPUNCTURE: CPT

## 2024-04-30 PROCEDURE — 93010 ELECTROCARDIOGRAM REPORT: CPT | Performed by: INTERNAL MEDICINE

## 2024-04-30 PROCEDURE — 2709999900 HC NON-CHARGEABLE SUPPLY

## 2024-04-30 PROCEDURE — 99152 MOD SED SAME PHYS/QHP 5/>YRS: CPT | Performed by: INTERNAL MEDICINE

## 2024-04-30 PROCEDURE — 80048 BASIC METABOLIC PNL TOTAL CA: CPT

## 2024-04-30 PROCEDURE — 93458 L HRT ARTERY/VENTRICLE ANGIO: CPT

## 2024-04-30 PROCEDURE — 6360000004 HC RX CONTRAST MEDICATION: Performed by: INTERNAL MEDICINE

## 2024-04-30 RX ORDER — SODIUM CHLORIDE 9 MG/ML
INJECTION, SOLUTION INTRAVENOUS PRN
Status: DISCONTINUED | OUTPATIENT
Start: 2024-04-30 | End: 2024-04-30 | Stop reason: HOSPADM

## 2024-04-30 RX ORDER — ONDANSETRON 2 MG/ML
4 INJECTION INTRAMUSCULAR; INTRAVENOUS EVERY 6 HOURS PRN
Status: DISCONTINUED | OUTPATIENT
Start: 2024-04-30 | End: 2024-04-30 | Stop reason: HOSPADM

## 2024-04-30 RX ORDER — SODIUM CHLORIDE 0.9 % (FLUSH) 0.9 %
5-40 SYRINGE (ML) INJECTION PRN
Status: DISCONTINUED | OUTPATIENT
Start: 2024-04-30 | End: 2024-04-30 | Stop reason: HOSPADM

## 2024-04-30 RX ORDER — SODIUM CHLORIDE 0.9 % (FLUSH) 0.9 %
5-40 SYRINGE (ML) INJECTION EVERY 12 HOURS SCHEDULED
Status: DISCONTINUED | OUTPATIENT
Start: 2024-04-30 | End: 2024-04-30 | Stop reason: HOSPADM

## 2024-04-30 RX ORDER — ACETAMINOPHEN 325 MG/1
650 TABLET ORAL EVERY 4 HOURS PRN
Status: DISCONTINUED | OUTPATIENT
Start: 2024-04-30 | End: 2024-04-30 | Stop reason: HOSPADM

## 2024-04-30 RX ORDER — SODIUM CHLORIDE 9 MG/ML
INJECTION, SOLUTION INTRAVENOUS CONTINUOUS
Status: DISCONTINUED | OUTPATIENT
Start: 2024-04-30 | End: 2024-04-30 | Stop reason: HOSPADM

## 2024-04-30 RX ADMIN — IOPAMIDOL 68 ML: 755 INJECTION, SOLUTION INTRAVENOUS at 12:51

## 2024-04-30 NOTE — DISCHARGE INSTRUCTIONS
Radial Angiogram      Care of your puncture site:  Remove clear bandage 24 hours after the procedure.  May shower in 24 hours  Inspect the site daily and gently clean using soap and water.  Dry thoroughly and apply a Band-Aid.    Normal Observations:  Soreness or tenderness which may last one week.  Mild oozing from the incision site.  Possible bruising that could last 2 weeks.    Activity:  You may resume driving 24 hours following the procedure.  You may resume normal activity in 3 days or after the wound heals.  Avoid lifting more than 10 pounds for 3 days with affected arm.    Nutrition:  Regular diet.  Drink at least 8 to 10 glasses of decaffeinated, non-alcoholic fluid for the next 24 hours to flush the x-ray dye used for your angiogram out of your body.    Call your doctor immediately if your condition worsens, for any other concerns, for a follow-up appointment or if you experience any of the following:  Significant bleeding that does not stop after 10 minutes of applying firm pressure on the puncture site.  Increased swelling of the wrist.  Unusual pain, numbness, or tingling of the wrist/arm.   Any signs of infection such as: redness, yellow drainage at the site, swelling or pain.    Other Instructions:  Resume Eliquis tonight  [facility] with [facility]

## 2024-04-30 NOTE — PROGRESS NOTES
PRE-PROCEDURE    DATE: 4/30/2024 ARRIVAL TO CATH LAB: 9:40 AM    ADMIT SOURCE: Outpatient    ID & ALLERGY BAND: On    CONSENT: Yes    NPO SINCE: Midnight    PULSES:  Right Radial Artery 2+  Right DP 3+  Right PT 1+  Left DP 3+  Left PT 1+    IV SITE : Started in Left hand.  with fluids infusing at kvo 9:40 AM     SURGERIES PLANNED: No    BLEEDING PROBLEMS: No    BLEEDING RISK: Low Risk <2%    COMPLIANCE: Yes      PATIENT HISTORY    CHIEF COMPLAINT: Arrythmia and Occ dizziness    EKG:  Yes    Pre CATH Rhythm: Normal Sinus Rhythm    STRESS TEST PREFORMED:  No    CARDIAC CTA PREFORMED:  Yes     Most recent date:  4/10/2024     Results:    Unknown    [place]ST[place] CORONARY CALCIUM SCORE:   Assessed: Yes     Score: 629    Date: 4/10/2024    ECHO: DATE:  Yes.  Most recent date: 4/17/2024      EF:  35    HYPERTENSION: Yes    DYSLIPIDEMIA: No    FAMILY HX OF CAD: No    PRIOR MI: No    PRIOR PCI: No    PRIOR CABG: No    CEREBRALVASCULAR DX: No    PERIPHERAL ARTERIAL DISEASE: No    CHRONIC LUNG DISEASE: No    TOBACCO: Former.   Quit Date: 20 years ago    DIABETIC: No    CARDIAC ARREST: No    DIALYSIS: No    FRAILTY SCORE: 3 MANAGING WELL (medical problems are well controlled, not regularly active beyond routine walking)

## 2024-04-30 NOTE — PROCEDURES
62  LVG- nml  LVEDP- 4  Aortic pressure- 117/83 There was no gradient between the left ventricle and aorta      MEDICATION AND PROCEDURAL RECONCILIATION:  An independent trained observer pushed medications at my direction. We monitored the patient's level of consciousness and vital signs/physiologic status throughout the procedure duration (see start and stop times below).  Sedation: 4 mg Versed, 200 mcg Fentanyl  Sedation start: 1212  Sedation stop: 1244  Contrast: 68 cc of Isovue   Flouro Time: 4.3 minutes of fluoroscopy     IMPRESSION/SUMMARY  ~Coronary Angiography w/ nonobstructive CAD  ~LVG with LVEF of 55% and no regional wall motion abnormalities    RECOMMENDATION:  ~Aggressive medical treatment and risk factor modification  ~1. Medications reviewed, no changes at this time.  2. Post cath IVF. Bedrest.  3. Resume eliquis tonight. Recommend statin. Recommend sleep study  4. Patient has been advised on the importance of regular exercise of at least 20-30 minutes daily alternating with aerobic and isometric activities.   5. Patient counseled about and offered assistance for smoking cessation   6. No indication for cardiac rehab  7. Follow up in 2 months with cardiology      Dwight Rivas MD, MD   Interventional Cardiology  4/30/2024 12:54 PM

## 2024-04-30 NOTE — PRE SEDATION
Brief Pre-Op Note/Sedation Assessment      Nicolas Craft  1970  [id number]  12:58 PM    Planned Procedure: Cardiac Catheterization Procedure  Post Procedure Plan: Return to same level of care  Consent: I have discussed with the patient and/or the patient representative the indication, alternatives, and the possible risks and/or complications of the planned procedure and the anesthesia methods. The patient and/or patient representative appear to understand and agree to proceed.        Chief Complaint:   Chest Pain/Pressure  Dyspnea on Exertion  Dyspnea      Indications for Cath Procedure:  Presentation:  New Onset Angina <= 2 months, Suspected CAD, and Cardiac Arrythmia  2.  Anginal Classification within 2 weeks:  CCS III - Symptoms with everyday living activities, i.e., moderate limitation  3.  Angina Symptoms Assessment:  Typical Chest Pain  4.  Heart Failure Class within last 2 weeks:  No symptoms  5.  Cardiovascular Instability:  No    Prior Ischemic Workup/Eval:  Pre-Procedural Medications: Yes: ACE/ARB/ARNI and Beta Blockers  2.   Stress Test Completed?  No    Does Patient need surgery?  Cath Valve Surgery:  No    Pre-Procedure Medical History:  Vital Signs:  BP (!) 155/96   Pulse 63   Temp 98.1 °F (36.7 °C)   Resp 16   Ht 1.905 m (6' 3\")   Wt (!) 199.6 kg (440 lb)   SpO2 99%   BMI 55.00 kg/m²     Allergies:  No Known Allergies  Medications:    Current Facility-Administered Medications   Medication Dose Route Frequency Provider Last Rate Last Admin    sodium chloride flush 0.9 % injection 5-40 mL  5-40 mL IntraVENous 2 times per day Dwight Rivas MD        sodium chloride flush 0.9 % injection 5-40 mL  5-40 mL IntraVENous PRN Dwight Rivas MD        0.9 % sodium chloride infusion   IntraVENous PRN Dwight Rivas MD           Past Medical History:    Past Medical History:   Diagnosis Date    Asthma     Atrial fibrillation (HCC)     Depression     Hx of blood clots     Hypertension         Surgical History:    Past Surgical History:   Procedure Laterality Date    INCOMPLETE CIRCUMCISION REPAIR               Pre-Sedation:  Pre-Sedation Documentation and Exam:  I have assessed the patient and reviewed the H&P on the chart.    Prior History of Anesthesia Complications:   none    Modified Mallampati:  II (soft palate, uvula, fauces visible)    ASA Classification:  Class 2 - A normal healthy patient with mild systemic disease    Krystyna Scale:  Activity:  2 - Able to move 4 extremities voluntarily on command  Respiration:  2 - Able to breathe deeply and cough freely  Circulation:  2 - BP+/- 20mmHg of normal  Consciousness:  2 - Fully awake  Oxygen Saturation (color):  2 - Able to maintain oxygen saturation >92% on room air    Sedation/Anesthesia Plan:  Guard the patient's safety and welfare.  Minimize physical discomfort and pain.  Minimize negative psychological responses to treatment by providing sedation and analgesia and maximize the potential amnesia.  Patient to meet pre-procedure discharge plan.    Medication Planned:  midazolam intravenously and fentanyl intravenously    Patient is an appropriate candidate for plan of sedation:   yes      Electronically signed by Dwight Rivas MD on 4/30/2024 at 12:58 PM

## 2024-04-30 NOTE — H&P
ventricle is mildly enlarged. Right ventricular systolic function   is normal.   -Trivial mitral regurgitation.   -The aortic root is mildly dilated.   -Trivial pulmonic regurgitation.    I have reviewed the history and physical and examined the patient and find no relevant changes in the history and physical exam, including heart and lung sounds, except for alberto/cardioversion. Currently in NSR. Eliquis held for 48 hours.  I have reviewed with the patient and/or family the risks, benefits, and alternatives to the procedure.    Based on these findings I recommend left heart cath for definitive evaluation of coronary arteries.  Risks, benefits, expectations, and alternative treatments were discussed.  Questions appropriately answered.  Nicolas Craft agrees to proceed and verbalized understanding.       Dwight Rivas MD 4/30/2024 9:47 AM

## 2024-05-01 ENCOUNTER — TELEPHONE (OUTPATIENT)
Dept: CARDIOLOGY CLINIC | Age: 54
End: 2024-05-01

## 2024-05-01 DIAGNOSIS — I48.91 ATRIAL FIBRILLATION, UNSPECIFIED TYPE (HCC): Primary | ICD-10-CM

## 2024-05-01 NOTE — TELEPHONE ENCOUNTER
Pt called stating they had LHC done and there were no blockages.     Pt would like to know since everything was ok with LHC do they still need tot take the blood thinners?    Pt would like to also know if they need to continue the monitor since the had CV to correct the irr HR?    Pls advise the pt [phone]    Thank you

## 2024-05-01 NOTE — TELEPHONE ENCOUNTER
Spoke to patient per Yusuf message and advised of all of the information. Referral placed for EP, Dr Ruelas. Pt v/u but stated that he may wait until after his appt with WAK to discuss before he schedules appt with EP. No further questions at this time.

## 2024-05-14 ENCOUNTER — TELEPHONE (OUTPATIENT)
Dept: CARDIOLOGY CLINIC | Age: 54
End: 2024-05-14

## 2024-05-14 NOTE — TELEPHONE ENCOUNTER
Please schedule new patient appointment with PAUL to evaluate and treat his Afib. He had afib 34% of the time

## 2024-05-14 NOTE — TELEPHONE ENCOUNTER
Spoke to Pt and he wanted appt. With WAK before scheduling with PAUL. Appt. Scheduled 05/24 with JUVENCIO.

## 2024-05-15 NOTE — PROGRESS NOTES
[facility]      CONSULTATION  [phone]  5/24/24  Referring: La Cerda, APRN - CNP (PCP)    REASON FOR CONSULT/CHIEF COMPLAINT/HPI     Reason for visit/ Chief complaint  CHF, atrial fibrillation   HPI Nicolas Craft is a 53 y.o. male patient I met in the hospital in April for new onset Atrial Fibrillation.  I cardioverted him after RONY on 4/17/2024.  He wore a monitor prior to and after cardioversion.  He remained in sinus for the last 3 weeks of the monitor.  LVEF by RONY was 35-40%.  It was 40% by TTE in 2021.     He has been very aggressively working on weight loss  and has lost over 110 lbs with just diet and exercise.  He plateau'd a little recently due to his dad dying and having to deal with that.  He can walk about 2 miles on a track before he has to stop d/t dyspnea, albeit at a slow pace of only 1 MPH.  He gets SOB climbing stairs or hills.  No leg swelling or orthopnea.     He doesn't feel palpitations. He gets occasional dizziness.  No chest pain.     [id number] Score for Atrial Fibrillation Stroke Risk    Risk   Factors   Component Value   C CHF Yes 1   H HTN Yes 1   A2 Age >= 75 No,  (53 y.o.) 0   D DM No 0   S2 Prior Stroke/TIA No 0   V Vascular Disease No 0   A Age 65-74 No,  (53 y.o.) 0   Sc Sex male 0     [id number]  Score   2     He does not smoke cigarettes, smokes marijuana occasionally, does not drink alcohol.  He works as a voice actor.     Today he doing well. He sprained his back a few weeks ago and ended up having to reschedule his prior follow-up visit.  He has not followed up with Sleep Medicine for possible sleep apnea.   He has had rare dizziness that he thinks might be due to his new bifocals.    Patient is adherent with medications and is tolerating them well without side effects     HISTORY/ALLERGIES/ROS     MedHx:  has a past medical history of Asthma, Atrial fibrillation (HCC), Depression, Hx of blood clots, and Hypertension.  SurgHx:  has a past

## 2024-05-24 ENCOUNTER — OFFICE VISIT (OUTPATIENT)
Dept: CARDIOLOGY CLINIC | Age: 54
End: 2024-05-24

## 2024-05-24 VITALS
HEIGHT: 75 IN | OXYGEN SATURATION: 96 % | SYSTOLIC BLOOD PRESSURE: 102 MMHG | DIASTOLIC BLOOD PRESSURE: 68 MMHG | BODY MASS INDEX: 39.17 KG/M2 | HEART RATE: 71 BPM | WEIGHT: 315 LBS

## 2024-05-24 DIAGNOSIS — Z86.718 HX OF BLOOD CLOTS: ICD-10-CM

## 2024-05-24 DIAGNOSIS — I25.10 CORONARY ARTERY DISEASE INVOLVING NATIVE CORONARY ARTERY OF NATIVE HEART WITHOUT ANGINA PECTORIS: ICD-10-CM

## 2024-05-24 DIAGNOSIS — I48.19 PERSISTENT ATRIAL FIBRILLATION (HCC): Primary | ICD-10-CM

## 2024-05-24 DIAGNOSIS — E66.01 CLASS 3 SEVERE OBESITY DUE TO EXCESS CALORIES WITH SERIOUS COMORBIDITY AND BODY MASS INDEX (BMI) OF 60.0 TO 69.9 IN ADULT (HCC): ICD-10-CM

## 2024-05-24 DIAGNOSIS — I10 ESSENTIAL HYPERTENSION: ICD-10-CM

## 2024-05-24 DIAGNOSIS — I50.42 CHRONIC COMBINED SYSTOLIC (CONGESTIVE) AND DIASTOLIC (CONGESTIVE) HEART FAILURE (HCC): ICD-10-CM

## 2024-05-24 PROBLEM — I50.22 CHRONIC SYSTOLIC HEART FAILURE (HCC): Status: ACTIVE | Noted: 2024-05-24

## 2024-05-24 PROBLEM — I51.89 GRADE I DIASTOLIC DYSFUNCTION: Status: ACTIVE | Noted: 2024-05-24

## 2024-05-24 RX ORDER — ROSUVASTATIN CALCIUM 10 MG/1
10 TABLET, COATED ORAL DAILY
Qty: 90 TABLET | Refills: 3 | Status: SHIPPED | OUTPATIENT
Start: 2024-05-24

## 2024-05-24 NOTE — PATIENT INSTRUCTIONS
Follow up with Dr Lepe in 1 month     Start:   New Prescriptions    ROSUVASTATIN (CRESTOR) 10 MG TABLET    Take 1 tablet by mouth daily    SEMAGLUTIDE-WEIGHT MANAGEMENT (WEGOVY) 0.25 MG/0.5ML SOAJ SC INJECTION    Inject 0.25 mg into the skin every 7 days     Recommend following up with sleep medicine.   Recommend Cardiac Rehab     Okay to start exercising    Call for any questions or concerns.

## 2024-05-28 ENCOUNTER — TELEPHONE (OUTPATIENT)
Dept: ADMINISTRATIVE | Age: 54
End: 2024-05-28

## 2024-05-28 NOTE — TELEPHONE ENCOUNTER
Submitted PA for Wegovy 0.25MG/0.5ML auto-injectors  Via CMM  (Key: [id number]) STATUS: DENIED    Message from Plan  Electronic prior authorization not supported as NDC is not payable.    If this requires a response please respond to the pool ( P MHCX PSC MEDICATION PRE-AUTH).      Thank you please advise patient.

## 2024-05-29 NOTE — TELEPHONE ENCOUNTER
Spoke to patient and advised. He would like the generic form to go through  pharmacy. Will write on Monday when WAK returns and call pt to advise. Also advised to move his appt out a little further due to not starting medication.

## 2024-06-03 ENCOUNTER — TELEPHONE (OUTPATIENT)
Dept: CARDIOLOGY CLINIC | Age: 54
End: 2024-06-03

## 2024-06-03 DIAGNOSIS — E66.01 CLASS 3 SEVERE OBESITY DUE TO EXCESS CALORIES WITH SERIOUS COMORBIDITY AND BODY MASS INDEX (BMI) OF 60.0 TO 69.9 IN ADULT (HCC): ICD-10-CM

## 2024-06-03 DIAGNOSIS — I48.19 PERSISTENT ATRIAL FIBRILLATION (HCC): ICD-10-CM

## 2024-06-03 DIAGNOSIS — I50.42 CHRONIC COMBINED SYSTOLIC (CONGESTIVE) AND DIASTOLIC (CONGESTIVE) HEART FAILURE (HCC): ICD-10-CM

## 2024-06-03 DIAGNOSIS — I25.10 CORONARY ARTERY DISEASE INVOLVING NATIVE CORONARY ARTERY OF NATIVE HEART WITHOUT ANGINA PECTORIS: ICD-10-CM

## 2024-06-13 ENCOUNTER — TELEPHONE (OUTPATIENT)
Dept: CARDIAC REHAB | Age: 54
End: 2024-06-13

## 2024-06-13 NOTE — TELEPHONE ENCOUNTER
Spoke with patient about cardiac rehab and he wanted to follow up with Dr. Lepe before scheduling cardiac rehab. Patient will call after his appointment.

## 2024-06-27 ENCOUNTER — TELEPHONE (OUTPATIENT)
Dept: CARDIOLOGY CLINIC | Age: 54
End: 2024-06-27

## 2024-06-27 DIAGNOSIS — I48.19 PERSISTENT ATRIAL FIBRILLATION (HCC): ICD-10-CM

## 2024-06-27 DIAGNOSIS — E66.01 CLASS 3 SEVERE OBESITY DUE TO EXCESS CALORIES WITH SERIOUS COMORBIDITY AND BODY MASS INDEX (BMI) OF 60.0 TO 69.9 IN ADULT (HCC): ICD-10-CM

## 2024-06-27 DIAGNOSIS — I25.10 CORONARY ARTERY DISEASE INVOLVING NATIVE CORONARY ARTERY OF NATIVE HEART WITHOUT ANGINA PECTORIS: ICD-10-CM

## 2024-06-27 DIAGNOSIS — I50.42 CHRONIC COMBINED SYSTOLIC (CONGESTIVE) AND DIASTOLIC (CONGESTIVE) HEART FAILURE (HCC): ICD-10-CM

## 2024-06-27 NOTE — TELEPHONE ENCOUNTER
Pt called in letting JUVENCIO know that the weight loss injection is working and would like to know if JUVENCIO can increase dosage as discussed and [facility] to [facility]   Please cookie  Thank you

## 2024-06-27 NOTE — TELEPHONE ENCOUNTER
Called patient and discussed medication refill. Advised that JUVENCIO is out of the office until 7/8 and I will send a script for the same dose. We can revisit when WAK returns to increase the dosage at that time. Pt is losing weight and doing well, no side effects. Pt understands and is agreeable to plan. Script sent to [facility].

## 2024-07-21 ENCOUNTER — HOSPITAL ENCOUNTER (EMERGENCY)
Age: 54
Discharge: HOME | End: 2024-07-21
Payer: MEDICAID

## 2024-07-21 VITALS
OXYGEN SATURATION: 97 % | SYSTOLIC BLOOD PRESSURE: 148 MMHG | RESPIRATION RATE: 18 BRPM | DIASTOLIC BLOOD PRESSURE: 101 MMHG | BODY MASS INDEX: 39.17 KG/M2 | HEIGHT: 75 IN | WEIGHT: 315 LBS | TEMPERATURE: 97.5 F | HEART RATE: 70 BPM

## 2024-07-21 DIAGNOSIS — K02.9 PAIN DUE TO DENTAL CARIES: Primary | ICD-10-CM

## 2024-07-21 PROCEDURE — 99283 EMERGENCY DEPT VISIT LOW MDM: CPT

## 2024-07-21 PROCEDURE — 6370000000 HC RX 637 (ALT 250 FOR IP): Performed by: PHYSICIAN ASSISTANT

## 2024-07-21 RX ORDER — PENICILLIN V POTASSIUM 250 MG/1
500 TABLET ORAL ONCE
Status: COMPLETED | OUTPATIENT
Start: 2024-07-21 | End: 2024-07-21

## 2024-07-21 RX ORDER — HYDROCODONE BITARTRATE AND ACETAMINOPHEN 5; 325 MG/1; MG/1
1 TABLET ORAL ONCE
Status: COMPLETED | OUTPATIENT
Start: 2024-07-21 | End: 2024-07-21

## 2024-07-21 RX ORDER — IBUPROFEN 800 MG/1
800 TABLET ORAL ONCE
Status: DISCONTINUED | OUTPATIENT
Start: 2024-07-21 | End: 2024-07-21

## 2024-07-21 RX ORDER — PENICILLIN V POTASSIUM 500 MG/1
500 TABLET ORAL 4 TIMES DAILY
Qty: 28 TABLET | Refills: 0 | Status: SHIPPED | OUTPATIENT
Start: 2024-07-21 | End: 2024-07-28

## 2024-07-21 RX ORDER — LIDOCAINE HYDROCHLORIDE 20 MG/ML
15 SOLUTION OROPHARYNGEAL ONCE
Status: COMPLETED | OUTPATIENT
Start: 2024-07-21 | End: 2024-07-21

## 2024-07-21 RX ORDER — IBUPROFEN 600 MG/1
600 TABLET ORAL 3 TIMES DAILY PRN
Qty: 30 TABLET | Refills: 0 | Status: SHIPPED | OUTPATIENT
Start: 2024-07-21 | End: 2024-07-21

## 2024-07-21 RX ORDER — ONDANSETRON 4 MG/1
4 TABLET, ORALLY DISINTEGRATING ORAL ONCE
Status: COMPLETED | OUTPATIENT
Start: 2024-07-21 | End: 2024-07-21

## 2024-07-21 RX ADMIN — HYDROCODONE BITARTRATE AND ACETAMINOPHEN 1 TABLET: 5; 325 TABLET ORAL at 03:34

## 2024-07-21 RX ADMIN — PENICILLIN V POTASSIUM 500 MG: 250 TABLET, FILM COATED ORAL at 03:34

## 2024-07-21 RX ADMIN — ONDANSETRON 4 MG: 4 TABLET, ORALLY DISINTEGRATING ORAL at 03:34

## 2024-07-21 RX ADMIN — LIDOCAINE HYDROCHLORIDE 15 ML: 20 SOLUTION ORAL at 03:34

## 2024-07-21 NOTE — ED PROVIDER NOTES
[facility] EMERGENCY DEPARTMENT  EMERGENCY DEPARTMENT ENCOUNTER        Pt Name: Nicolas Craft  MRN: [id number]  Birthdate 1970  Date of evaluation: 7/21/2024  Provider: Selin Amos PA-C  PCP: La Franks APRN - CNP  Note Started: 2:58 AM EDT 7/21/24      GALDINO. I have evaluated this patient.        CHIEF COMPLAINT       Chief Complaint   Patient presents with    Dental Pain     Pt w c/o dental pain @ R side d/t broken tooth       HISTORY OF PRESENT ILLNESS: 1 or more Elements     History From: Patient  Limitations to history : None    Nicolas Craft is a 54 y.o. male who presents to the emergency department today for evaluation for concerns of dental pain.  Patient reports that he has had a broken tooth to his right lower jawline, which has been there for for some time.  Patient reported over the past 2 to 3 days he states that he has had increasing pain.  The patient reports that the pain is radiating towards his ear.  The patient is currently rating his pain as a 9/10, his pain is constant, need otherwise denies any known alleviating or aggravating factors.  He is not a diabetic.  He does not smoke.  He has no fevers.  No nausea or vomiting, patient otherwise has no other complaint    Nursing Notes were all reviewed and agreed with or any disagreements were addressed in the HPI.    REVIEW OF SYSTEMS :      Review of Systems   Constitutional:  Negative for activity change, appetite change, chills and fever.   HENT:  Positive for dental problem. Negative for congestion and rhinorrhea.    Respiratory:  Negative for cough and shortness of breath.    Cardiovascular:  Negative for chest pain.   Gastrointestinal:  Negative for abdominal pain, diarrhea, nausea and vomiting.   Genitourinary:  Negative for difficulty urinating, dysuria and hematuria.       Positives and Pertinent negatives as per HPI.     SURGICAL HISTORY     Past Surgical History:   Procedure Laterality Date

## 2024-07-31 NOTE — PROGRESS NOTES
[facility]      CONSULTATION  [phone]  8/5/24  Referring:  La Franks APRN - CNP (PCP)    REASON FOR CONSULT/CHIEF COMPLAINT/HPI     Reason for visit/ Chief complaint  CHF, atrial fibrillation, obesity  Re-evaluate after starting semaglutide for weight loss     HPI Nicolas Craft is a 54 y.o. male patient I met in the hospital in April for new onset Atrial Fibrillation.  I cardioverted him after RONY on 4/17/2024.  He wore a monitor prior to and after cardioversion.  He remained in sinus for the last 3 weeks of the monitor.  LVEF by RONY was 35-40%.  It was 40% by TTE in 2021.     [id number] Score for Atrial Fibrillation Stroke Risk    Risk   Factors   Component Value   C CHF Yes 1   H HTN Yes 1   A2 Age >= 75 No,  (53 y.o.) 0   D DM No 0   S2 Prior Stroke/TIA No 0   V Vascular Disease No 0   A Age 65-74 No,  (53 y.o.) 0   Sc Sex male 0     [id number]  Score   2     He does not smoke cigarettes, smokes marijuana occasionally, does not drink alcohol.  He works as a voice actor.     Today, he is here for regular follow up. He continues to lose weight while on Ozempic. He is down 50# since May 2024. He has no complaints. He denies exertional chest pain, BOWDEN/PND, palpitations, light-headedness, edema. He takes regular walks. He still has not seen sleep physician, states he sleeps well; he wants to lose more weight to see if that helps potential sleep apnea. He is asking for both inhaler refills to tide him over until he finds a new PCP (he may continue to see La Franks).    Patient is adherent with medications and is tolerating them well without side effects     HISTORY/ALLERGIES/ROS     MedHx:  has a past medical history of Asthma, Atrial fibrillation (HCC), Depression, Hx of blood clots, and Hypertension.  SurgHx:  has a past surgical history that includes incomplete circumcision repair.   SocHx:  reports that he quit smoking about 22 years ago. His smoking use included cigarettes. He

## 2024-08-05 ENCOUNTER — OFFICE VISIT (OUTPATIENT)
Dept: CARDIOLOGY CLINIC | Age: 54
End: 2024-08-05
Payer: MEDICAID

## 2024-08-05 VITALS
OXYGEN SATURATION: 96 % | BODY MASS INDEX: 38.36 KG/M2 | WEIGHT: 315 LBS | SYSTOLIC BLOOD PRESSURE: 120 MMHG | HEIGHT: 76 IN | HEART RATE: 77 BPM | DIASTOLIC BLOOD PRESSURE: 92 MMHG

## 2024-08-05 DIAGNOSIS — I25.10 CORONARY ARTERY DISEASE INVOLVING NATIVE CORONARY ARTERY OF NATIVE HEART WITHOUT ANGINA PECTORIS: ICD-10-CM

## 2024-08-05 DIAGNOSIS — J45.909 MODERATE ASTHMA, UNSPECIFIED WHETHER COMPLICATED, UNSPECIFIED WHETHER PERSISTENT: ICD-10-CM

## 2024-08-05 DIAGNOSIS — I50.22 CHRONIC SYSTOLIC CONGESTIVE HEART FAILURE (HCC): Primary | ICD-10-CM

## 2024-08-05 DIAGNOSIS — E66.01 MORBID OBESITY DUE TO EXCESS CALORIES (HCC): ICD-10-CM

## 2024-08-05 DIAGNOSIS — I48.0 PAROXYSMAL ATRIAL FIBRILLATION (HCC): ICD-10-CM

## 2024-08-05 PROCEDURE — G8417 CALC BMI ABV UP PARAM F/U: HCPCS | Performed by: INTERNAL MEDICINE

## 2024-08-05 PROCEDURE — 93000 ELECTROCARDIOGRAM COMPLETE: CPT | Performed by: INTERNAL MEDICINE

## 2024-08-05 PROCEDURE — G8427 DOCREV CUR MEDS BY ELIG CLIN: HCPCS | Performed by: INTERNAL MEDICINE

## 2024-08-05 PROCEDURE — 99214 OFFICE O/P EST MOD 30 MIN: CPT | Performed by: INTERNAL MEDICINE

## 2024-08-05 PROCEDURE — 3074F SYST BP LT 130 MM HG: CPT | Performed by: INTERNAL MEDICINE

## 2024-08-05 PROCEDURE — 3080F DIAST BP >= 90 MM HG: CPT | Performed by: INTERNAL MEDICINE

## 2024-08-05 PROCEDURE — 1036F TOBACCO NON-USER: CPT | Performed by: INTERNAL MEDICINE

## 2024-08-05 PROCEDURE — 3017F COLORECTAL CA SCREEN DOC REV: CPT | Performed by: INTERNAL MEDICINE

## 2024-08-05 RX ORDER — AMLODIPINE BESYLATE 10 MG/1
10 TABLET ORAL DAILY
COMMUNITY

## 2024-08-05 RX ORDER — ALBUTEROL SULFATE 90 UG/1
1 AEROSOL, METERED RESPIRATORY (INHALATION) EVERY 6 HOURS PRN
Qty: 18 G | Refills: 0 | Status: SHIPPED | OUTPATIENT
Start: 2024-08-05

## 2024-08-05 NOTE — PATIENT INSTRUCTIONS
Increase semaglutide injections to 1mg weekly.    Continue regular exercise.    Call the office for any new, worsening, or concerning symptoms.

## 2024-08-06 DIAGNOSIS — J45.20 MILD INTERMITTENT ASTHMA, UNSPECIFIED WHETHER COMPLICATED: ICD-10-CM

## 2024-08-06 DIAGNOSIS — K21.9 GASTROESOPHAGEAL REFLUX DISEASE WITHOUT ESOPHAGITIS: ICD-10-CM

## 2024-08-06 DIAGNOSIS — E66.01 CLASS 3 SEVERE OBESITY DUE TO EXCESS CALORIES WITH SERIOUS COMORBIDITY AND BODY MASS INDEX (BMI) OF 60.0 TO 69.9 IN ADULT (HCC): ICD-10-CM

## 2024-08-06 DIAGNOSIS — Z00.00 EXAMINATION FOR, MEDICAL, GENERAL: Primary | ICD-10-CM

## 2024-09-19 ENCOUNTER — TELEPHONE (OUTPATIENT)
Dept: CARDIOLOGY CLINIC | Age: 54
End: 2024-09-19

## 2024-09-19 ENCOUNTER — HOSPITAL ENCOUNTER (OUTPATIENT)
Age: 54
Discharge: HOME | End: 2024-09-21
Attending: INTERNAL MEDICINE
Payer: MEDICAID

## 2024-09-19 VITALS
BODY MASS INDEX: 39.17 KG/M2 | DIASTOLIC BLOOD PRESSURE: 90 MMHG | WEIGHT: 315 LBS | HEIGHT: 75 IN | SYSTOLIC BLOOD PRESSURE: 123 MMHG

## 2024-09-19 DIAGNOSIS — E66.01 CLASS 3 SEVERE OBESITY DUE TO EXCESS CALORIES WITH SERIOUS COMORBIDITY AND BODY MASS INDEX (BMI) OF 60.0 TO 69.9 IN ADULT (HCC): Primary | ICD-10-CM

## 2024-09-19 DIAGNOSIS — I50.22 CHRONIC SYSTOLIC CONGESTIVE HEART FAILURE (HCC): ICD-10-CM

## 2024-09-19 DIAGNOSIS — I25.10 CORONARY ARTERY DISEASE INVOLVING NATIVE CORONARY ARTERY OF NATIVE HEART WITHOUT ANGINA PECTORIS: ICD-10-CM

## 2024-09-19 DIAGNOSIS — I48.0 PAROXYSMAL ATRIAL FIBRILLATION (HCC): ICD-10-CM

## 2024-09-19 LAB
ECHO AO ASC DIAM: 3.9 CM
ECHO AO ASCENDING AORTA INDEX: 1.27 CM/M2
ECHO AO ROOT DIAM: 3 CM
ECHO AO ROOT INDEX: 0.98 CM/M2
ECHO AV AREA PEAK VELOCITY: 3.3 CM2
ECHO AV AREA VTI: 3.3 CM2
ECHO AV AREA/BSA PEAK VELOCITY: 1.1 CM2/M2
ECHO AV AREA/BSA VTI: 1.1 CM2/M2
ECHO AV MEAN GRADIENT: 3 MMHG
ECHO AV MEAN VELOCITY: 0.8 M/S
ECHO AV PEAK GRADIENT: 5 MMHG
ECHO AV PEAK VELOCITY: 1.1 M/S
ECHO AV VELOCITY RATIO: 0.82
ECHO AV VTI: 25 CM
ECHO BSA: 3.24 M2
ECHO IVC PROX: 1.9 CM
ECHO LA AREA 2C: 28.3 CM2
ECHO LA AREA 4C: 28.3 CM2
ECHO LA DIAMETER INDEX: 1.6 CM/M2
ECHO LA DIAMETER: 4.9 CM
ECHO LA MAJOR AXIS: 6.1 CM
ECHO LA MINOR AXIS: 6.3 CM
ECHO LA TO AORTIC ROOT RATIO: 1.63
ECHO LA VOL BP: 109 ML (ref 18–58)
ECHO LA VOL MOD A2C: 106 ML (ref 18–58)
ECHO LA VOL MOD A4C: 109 ML (ref 18–58)
ECHO LA VOL/BSA BIPLANE: 36 ML/M2 (ref 16–34)
ECHO LA VOLUME INDEX MOD A2C: 35 ML/M2 (ref 16–34)
ECHO LA VOLUME INDEX MOD A4C: 36 ML/M2 (ref 16–34)
ECHO LV E' LATERAL VELOCITY: 6 CM/S
ECHO LV E' SEPTAL VELOCITY: 4 CM/S
ECHO LV EDV A2C: 278 ML
ECHO LV EDV A4C: 277 ML
ECHO LV EDV INDEX A4C: 91 ML/M2
ECHO LV EDV NDEX A2C: 91 ML/M2
ECHO LV EJECTION FRACTION A2C: 46 %
ECHO LV EJECTION FRACTION A4C: 49 %
ECHO LV EJECTION FRACTION BIPLANE: 45 % (ref 55–100)
ECHO LV ESV A2C: 150 ML
ECHO LV ESV A4C: 141 ML
ECHO LV ESV INDEX A2C: 49 ML/M2
ECHO LV ESV INDEX A4C: 46 ML/M2
ECHO LV FRACTIONAL SHORTENING: 32 % (ref 28–44)
ECHO LV INTERNAL DIMENSION DIASTOLE INDEX: 2.25 CM/M2
ECHO LV INTERNAL DIMENSION DIASTOLIC: 6.9 CM (ref 4.2–5.9)
ECHO LV INTERNAL DIMENSION SYSTOLIC INDEX: 1.54 CM/M2
ECHO LV INTERNAL DIMENSION SYSTOLIC: 4.7 CM
ECHO LV IVSD: 1.5 CM (ref 0.6–1)
ECHO LV MASS 2D: 486.6 G (ref 88–224)
ECHO LV MASS INDEX 2D: 159 G/M2 (ref 49–115)
ECHO LV POSTERIOR WALL DIASTOLIC: 1.3 CM (ref 0.6–1)
ECHO LV RELATIVE WALL THICKNESS RATIO: 0.38
ECHO LVOT AREA: 4.2 CM2
ECHO LVOT AV VTI INDEX: 0.8
ECHO LVOT DIAM: 2.3 CM
ECHO LVOT MEAN GRADIENT: 1 MMHG
ECHO LVOT PEAK GRADIENT: 3 MMHG
ECHO LVOT PEAK VELOCITY: 0.9 M/S
ECHO LVOT STROKE VOLUME INDEX: 27.1 ML/M2
ECHO LVOT SV: 83.1 ML
ECHO LVOT VTI: 20 CM
ECHO MV A VELOCITY: 0.8 M/S
ECHO MV AREA VTI: 2.1 CM2
ECHO MV E DECELERATION TIME (DT): 269 MS
ECHO MV E VELOCITY: 0.51 M/S
ECHO MV E/A RATIO: 0.64
ECHO MV E/E' LATERAL: 8.5
ECHO MV E/E' RATIO (AVERAGED): 10.63
ECHO MV E/E' SEPTAL: 12.75
ECHO MV LVOT VTI INDEX: 2.01
ECHO MV MAX VELOCITY: 0.8 M/S
ECHO MV MEAN GRADIENT: 1 MMHG
ECHO MV MEAN VELOCITY: 0.6 M/S
ECHO MV PEAK GRADIENT: 2 MMHG
ECHO MV VTI: 40.1 CM
ECHO PV MAX VELOCITY: 1 M/S
ECHO PV PEAK GRADIENT: 4 MMHG
ECHO RA AREA 4C: 21.2 CM2
ECHO RA END SYSTOLIC VOLUME APICAL 4 CHAMBER INDEX BSA: 28 ML/M2
ECHO RA VOLUME: 85 ML
ECHO RV BASAL DIMENSION: 5.4 CM
ECHO RV FREE WALL PEAK S': 15 CM/S
ECHO RV LONGITUDINAL DIMENSION: 10.6 CM
ECHO RV MID DIMENSION: 2.9 CM
ECHO RV TAPSE: 3.5 CM (ref 1.7–?)

## 2024-09-19 PROCEDURE — 93306 TTE W/DOPPLER COMPLETE: CPT

## 2024-09-25 ENCOUNTER — TELEPHONE (OUTPATIENT)
Dept: ADMINISTRATIVE | Age: 54
End: 2024-09-25

## 2024-09-26 NOTE — TELEPHONE ENCOUNTER
The NDC the pharmacy is using is not a valid one, they need to use a different one per pts insurance plan

## 2024-09-27 NOTE — TELEPHONE ENCOUNTER
[facility] does not accept insurance.      I called Mr. Craft and he reported that he self pays for this medication and has already picked up the new prescription and taken the first injection.  Informed him that I would note this in his chart.  He verbalized understanding and denies any questions and/or concerns.

## 2024-10-08 PROBLEM — I25.10 CORONARY ARTERY DISEASE INVOLVING NATIVE CORONARY ARTERY OF NATIVE HEART WITHOUT ANGINA PECTORIS: Status: ACTIVE | Noted: 2024-10-08

## 2024-10-08 PROBLEM — E66.01 MORBID OBESITY DUE TO EXCESS CALORIES: Status: ACTIVE | Noted: 2024-10-08

## 2024-10-08 NOTE — PROGRESS NOTES
[facility]      CONSULTATION  [phone]  10/31/24  Referring:  La Franks APRN - CNP (PCP)    REASON FOR CONSULT/CHIEF COMPLAINT/HPI     Reason for visit/ Chief complaint  CHF, atrial fibrillation, obesity  Re-evaluate after starting semaglutide for weight loss     HPI Nicolas Craft is a 54 y.o. male patient here for 3 month follow up.     I met him in the hospital in April for new onset Atrial Fibrillation.  I cardioverted him after RONY on 4/17/2024.  He wore a monitor prior to and after cardioversion.  He remained in sinus for the last 3 weeks of the monitor.  LVEF by RONY was 35-40%.  It was 40% by TTE in 2021.     [id number] Score for Atrial Fibrillation Stroke Risk    Risk   Factors   Component Value   C CHF Yes 1   H HTN Yes 1   A2 Age >= 75 No,  (53 y.o.) 0   D DM No 0   S2 Prior Stroke/TIA No 0   V Vascular Disease No 0   A Age 65-74 No,  (53 y.o.) 0   Sc Sex male 0     [id number]  Score   2     He does not smoke cigarettes, smokes marijuana occasionally, does not drink alcohol.  He works as a voice actor.     He continues to lose weight while on Ozempic. He is down almost 80# since May 2024. He takes regular walks. He still has not seen sleep physician, states he sleeps well; he wants to lose more weight to see if that helps potential sleep apnea.     Today he is doing well. He recently lost his mother a few weeks ago. He feels good. No complaints of CP, SOB, palpitations or dizziness at this time. He has had a little set back in exercise routine due to his mom dying but is getting back to it.     Patient is adherent with medications and is tolerating them well without side effects     HISTORY/ALLERGIES/ROS     MedHx:  has a past medical history of Asthma, Atrial fibrillation (HCC), Depression, Hx of blood clots, and Hypertension.  SurgHx:  has a past surgical history that includes incomplete circumcision repair.   SocHx:  reports that he quit smoking about 22 years ago. His

## 2024-10-31 ENCOUNTER — OFFICE VISIT (OUTPATIENT)
Dept: CARDIOLOGY CLINIC | Age: 54
End: 2024-10-31

## 2024-10-31 VITALS
DIASTOLIC BLOOD PRESSURE: 68 MMHG | WEIGHT: 315 LBS | BODY MASS INDEX: 39.17 KG/M2 | OXYGEN SATURATION: 97 % | HEIGHT: 75 IN | SYSTOLIC BLOOD PRESSURE: 122 MMHG | HEART RATE: 57 BPM

## 2024-10-31 DIAGNOSIS — E66.813 CLASS 3 SEVERE OBESITY DUE TO EXCESS CALORIES WITH SERIOUS COMORBIDITY AND BODY MASS INDEX (BMI) OF 60.0 TO 69.9 IN ADULT: ICD-10-CM

## 2024-10-31 DIAGNOSIS — I48.0 PAROXYSMAL ATRIAL FIBRILLATION (HCC): ICD-10-CM

## 2024-10-31 DIAGNOSIS — I50.22 CHRONIC SYSTOLIC CONGESTIVE HEART FAILURE (HCC): Primary | ICD-10-CM

## 2024-10-31 DIAGNOSIS — I25.10 CORONARY ARTERY DISEASE INVOLVING NATIVE CORONARY ARTERY OF NATIVE HEART WITHOUT ANGINA PECTORIS: ICD-10-CM

## 2024-10-31 DIAGNOSIS — J45.909 MODERATE ASTHMA, UNSPECIFIED WHETHER COMPLICATED, UNSPECIFIED WHETHER PERSISTENT: ICD-10-CM

## 2024-10-31 DIAGNOSIS — E66.01 CLASS 3 SEVERE OBESITY DUE TO EXCESS CALORIES WITH SERIOUS COMORBIDITY AND BODY MASS INDEX (BMI) OF 60.0 TO 69.9 IN ADULT: ICD-10-CM

## 2024-10-31 DIAGNOSIS — E66.01 MORBID OBESITY DUE TO EXCESS CALORIES: ICD-10-CM

## 2024-10-31 RX ORDER — AMLODIPINE BESYLATE 10 MG/1
10 TABLET ORAL DAILY
Qty: 30 TABLET | Refills: 0 | Status: SHIPPED | OUTPATIENT
Start: 2024-10-31

## 2024-10-31 RX ORDER — ALBUTEROL SULFATE 90 UG/1
1 INHALANT RESPIRATORY (INHALATION) EVERY 6 HOURS PRN
Qty: 18 G | Refills: 0 | Status: SHIPPED | OUTPATIENT
Start: 2024-10-31

## 2024-10-31 RX ORDER — METOPROLOL SUCCINATE 50 MG/1
50 TABLET, EXTENDED RELEASE ORAL DAILY
Qty: 30 TABLET | Refills: 0 | Status: SHIPPED | OUTPATIENT
Start: 2024-10-31 | End: 2024-11-30

## 2024-10-31 NOTE — PATIENT INSTRUCTIONS
Follow up with Dr Lepe in 3 months     Increase Semaglutide to 2.4 mg weekly. I sent to [facility], call them to schedule .       Call for any questions or concerns.

## 2024-12-19 NOTE — PROGRESS NOTES
adult      AF - this may or may not be the culprit for his CHF.  With his marked obesity, he very likely has SALVATORE, which is likely driving his AF.  He has a high risk of AF recurrence.  He has remained in NSR for over five months since cardioversion with just beta blockade and without anti-arrhythmic drugs.  I think he wll benefit from an ablation, however at his large body size he would 1) have a hard time fitting on the cath lab table and 2) would have a much higher risk for complications.  Will continue NOAC and beta blockade at this time.  Will consider ablation as he loses weight and becomes a better candidate.  If he has a recurrence in the near future will plan to start amiodarone for suppression with a possible repeat cardioversion.  Given his low LVEF I have a much lower threshold for ablation than if his LVEF were normal.  Reassuring that LVEF is increasing.  SALVATORE needs to be evaluated and treated.  Patient wants to wait for a sleep study until after he has lost more weight.  CHADS-VASC 2 - continue NOAC given risk of stroke.  CHF - likely multifactorial.  Doesn't appear to be ischemic as his mild CAD is out of proportion to his degree of CHF.  He is euvolemic, NYHA I today.  His obesity itself is likely causing and/or contributing to his atrial fibrillation and CHF.  He is thriving on compounded semaglutide with over 100 lbs of weight loss in the last year without side effects.  Continue beta blocker and Entresto.  No further ischemic testing needed at this time.  Cath in 2024 showed no severe lesions.  LVEF is now improved up to about 45-50% on last echo.  No indication for ICD.    CAD - he has nonobstructive CAD.  CT scan indicated FFR of 0.78 through his LAD, however cath showed that this was only a 50% lesion, thus it was not intervened upon.  Discussed importance of keeping cholesterol low.  He has mixed hyperlipidemia mostly with low HDL.   Start rosuvastatin 10 mg daily.  Obesity - on max dose

## 2024-12-30 DIAGNOSIS — J45.909 MODERATE ASTHMA, UNSPECIFIED WHETHER COMPLICATED, UNSPECIFIED WHETHER PERSISTENT: ICD-10-CM

## 2024-12-30 RX ORDER — AMLODIPINE BESYLATE 10 MG/1
10 TABLET ORAL DAILY
Qty: 90 TABLET | Refills: 3 | Status: SHIPPED | OUTPATIENT
Start: 2024-12-30

## 2024-12-30 RX ORDER — ALBUTEROL SULFATE 90 UG/1
1 INHALANT RESPIRATORY (INHALATION) EVERY 6 HOURS PRN
Qty: 18 G | Refills: 0 | OUTPATIENT
Start: 2024-12-30

## 2024-12-30 RX ORDER — METOPROLOL SUCCINATE 50 MG/1
50 TABLET, EXTENDED RELEASE ORAL DAILY
Qty: 30 TABLET | Refills: 0 | Status: SHIPPED | OUTPATIENT
Start: 2024-12-30 | End: 2025-01-29

## 2024-12-30 NOTE — TELEPHONE ENCOUNTER
Pt was to establish with PCP for further refills on inhalers per last OV note. Please call pt and advise. Other refills sent.

## 2025-01-14 DIAGNOSIS — J45.909 MODERATE ASTHMA, UNSPECIFIED WHETHER COMPLICATED, UNSPECIFIED WHETHER PERSISTENT: ICD-10-CM

## 2025-01-14 RX ORDER — ALBUTEROL SULFATE 90 UG/1
1 INHALANT RESPIRATORY (INHALATION) EVERY 6 HOURS PRN
Qty: 18 G | Refills: 1 | OUTPATIENT
Start: 2025-01-14

## 2025-01-14 NOTE — TELEPHONE ENCOUNTER
Received refill request for ALBUTEROL HFA 200puffs from [facility] pharmacy.     Last OV: 10/31/24    Next OV: 01/16/25    Last Labs: none    Last Filled: 10/31/25

## 2025-01-16 ENCOUNTER — OFFICE VISIT (OUTPATIENT)
Dept: CARDIOLOGY CLINIC | Age: 55
End: 2025-01-16

## 2025-01-16 VITALS
HEIGHT: 75 IN | OXYGEN SATURATION: 98 % | WEIGHT: 315 LBS | HEART RATE: 60 BPM | BODY MASS INDEX: 39.17 KG/M2 | DIASTOLIC BLOOD PRESSURE: 90 MMHG | SYSTOLIC BLOOD PRESSURE: 126 MMHG

## 2025-01-16 DIAGNOSIS — I25.10 CORONARY ARTERY DISEASE INVOLVING NATIVE CORONARY ARTERY OF NATIVE HEART WITHOUT ANGINA PECTORIS: ICD-10-CM

## 2025-01-16 DIAGNOSIS — E66.01 MORBID OBESITY DUE TO EXCESS CALORIES: ICD-10-CM

## 2025-01-16 DIAGNOSIS — E66.01 CLASS 3 SEVERE OBESITY DUE TO EXCESS CALORIES WITH SERIOUS COMORBIDITY AND BODY MASS INDEX (BMI) OF 50.0 TO 59.9 IN ADULT: ICD-10-CM

## 2025-01-16 DIAGNOSIS — I50.22 CHRONIC SYSTOLIC CONGESTIVE HEART FAILURE (HCC): Primary | ICD-10-CM

## 2025-01-16 DIAGNOSIS — J45.909 MODERATE ASTHMA, UNSPECIFIED WHETHER COMPLICATED, UNSPECIFIED WHETHER PERSISTENT: ICD-10-CM

## 2025-01-16 DIAGNOSIS — E66.01 CLASS 3 SEVERE OBESITY DUE TO EXCESS CALORIES WITH SERIOUS COMORBIDITY AND BODY MASS INDEX (BMI) OF 60.0 TO 69.9 IN ADULT: ICD-10-CM

## 2025-01-16 DIAGNOSIS — E66.813 CLASS 3 SEVERE OBESITY DUE TO EXCESS CALORIES WITH SERIOUS COMORBIDITY AND BODY MASS INDEX (BMI) OF 60.0 TO 69.9 IN ADULT: ICD-10-CM

## 2025-01-16 DIAGNOSIS — I48.0 PAROXYSMAL ATRIAL FIBRILLATION (HCC): ICD-10-CM

## 2025-01-16 DIAGNOSIS — E66.813 CLASS 3 SEVERE OBESITY DUE TO EXCESS CALORIES WITH SERIOUS COMORBIDITY AND BODY MASS INDEX (BMI) OF 50.0 TO 59.9 IN ADULT: ICD-10-CM

## 2025-01-16 RX ORDER — METOPROLOL SUCCINATE 50 MG/1
50 TABLET, EXTENDED RELEASE ORAL DAILY
Qty: 30 TABLET | Refills: 0 | Status: SHIPPED | OUTPATIENT
Start: 2025-01-16 | End: 2025-02-15

## 2025-01-16 RX ORDER — ROSUVASTATIN CALCIUM 10 MG/1
10 TABLET, COATED ORAL DAILY
Qty: 90 TABLET | Refills: 3 | Status: SHIPPED | OUTPATIENT
Start: 2025-01-16

## 2025-01-16 RX ORDER — ALBUTEROL SULFATE 90 UG/1
1 INHALANT RESPIRATORY (INHALATION) EVERY 6 HOURS PRN
Qty: 18 G | Refills: 0 | Status: SHIPPED | OUTPATIENT
Start: 2025-01-16

## 2025-01-16 RX ORDER — AMLODIPINE BESYLATE 10 MG/1
10 TABLET ORAL DAILY
Qty: 90 TABLET | Refills: 3 | Status: SHIPPED | OUTPATIENT
Start: 2025-01-16

## 2025-01-16 NOTE — PATIENT INSTRUCTIONS
Follow up with Dr Lepe in 6 months     Refills sent to pharmacy.     Call for any questions or concerns.

## 2025-02-03 ENCOUNTER — TELEPHONE (OUTPATIENT)
Dept: CARDIOLOGY CLINIC | Age: 55
End: 2025-02-03

## 2025-02-03 RX ORDER — METOPROLOL SUCCINATE 50 MG/1
50 TABLET, EXTENDED RELEASE ORAL DAILY
Qty: 90 TABLET | Refills: 3 | Status: SHIPPED | OUTPATIENT
Start: 2025-02-03

## 2025-02-03 NOTE — TELEPHONE ENCOUNTER
Not sure what happened. Metoprolol resent to pharmacy. He was started on Rosuvastatin by JUVENCIO at last appt. This is for his cholesterol. Please call pt to advise. Thank you!

## 2025-02-03 NOTE — TELEPHONE ENCOUNTER
Patient states that when he went to  prescriptions, he did not receive metoprolol.     He noticed that he received a rosuvastatin and wanted to know if JUVENCIO wanted him to take this medication.    He was not aware of any medication changes and wanted some confirmation.    Please advise

## 2025-02-19 ENCOUNTER — APPOINTMENT (OUTPATIENT)
Dept: GENERAL RADIOLOGY | Age: 55
End: 2025-02-19
Payer: MEDICAID

## 2025-02-19 ENCOUNTER — HOSPITAL ENCOUNTER (EMERGENCY)
Age: 55
Discharge: HOME | End: 2025-02-20
Payer: MEDICAID

## 2025-02-19 VITALS
HEIGHT: 75 IN | WEIGHT: 315 LBS | TEMPERATURE: 98.3 F | HEART RATE: 72 BPM | RESPIRATION RATE: 20 BRPM | DIASTOLIC BLOOD PRESSURE: 91 MMHG | BODY MASS INDEX: 39.17 KG/M2 | OXYGEN SATURATION: 98 % | SYSTOLIC BLOOD PRESSURE: 105 MMHG

## 2025-02-19 DIAGNOSIS — J45.909 MODERATE ASTHMA, UNSPECIFIED WHETHER COMPLICATED, UNSPECIFIED WHETHER PERSISTENT: ICD-10-CM

## 2025-02-19 DIAGNOSIS — J45.901 EXACERBATION OF ASTHMA, UNSPECIFIED ASTHMA SEVERITY, UNSPECIFIED WHETHER PERSISTENT: Primary | ICD-10-CM

## 2025-02-19 PROCEDURE — 6360000002 HC RX W HCPCS: Performed by: NURSE PRACTITIONER

## 2025-02-19 PROCEDURE — 6370000000 HC RX 637 (ALT 250 FOR IP): Performed by: NURSE PRACTITIONER

## 2025-02-19 PROCEDURE — 71045 X-RAY EXAM CHEST 1 VIEW: CPT

## 2025-02-19 PROCEDURE — 99283 EMERGENCY DEPT VISIT LOW MDM: CPT

## 2025-02-19 PROCEDURE — 94640 AIRWAY INHALATION TREATMENT: CPT

## 2025-02-19 RX ORDER — ALBUTEROL SULFATE 0.83 MG/ML
5 SOLUTION RESPIRATORY (INHALATION) ONCE
Status: COMPLETED | OUTPATIENT
Start: 2025-02-19 | End: 2025-02-19

## 2025-02-19 RX ORDER — PREDNISONE 20 MG/1
60 TABLET ORAL ONCE
Status: COMPLETED | OUTPATIENT
Start: 2025-02-19 | End: 2025-02-19

## 2025-02-19 RX ORDER — IPRATROPIUM BROMIDE AND ALBUTEROL SULFATE 2.5; .5 MG/3ML; MG/3ML
1 SOLUTION RESPIRATORY (INHALATION) ONCE
Status: COMPLETED | OUTPATIENT
Start: 2025-02-19 | End: 2025-02-19

## 2025-02-19 RX ADMIN — PREDNISONE 60 MG: 20 TABLET ORAL at 23:10

## 2025-02-19 RX ADMIN — ALBUTEROL SULFATE 5 MG: 2.5 SOLUTION RESPIRATORY (INHALATION) at 23:48

## 2025-02-19 RX ADMIN — IPRATROPIUM BROMIDE AND ALBUTEROL SULFATE 1 DOSE: .5; 3 SOLUTION RESPIRATORY (INHALATION) at 23:48

## 2025-02-19 ASSESSMENT — PAIN - FUNCTIONAL ASSESSMENT: PAIN_FUNCTIONAL_ASSESSMENT: NONE - DENIES PAIN

## 2025-02-20 RX ORDER — PREDNISONE 10 MG/1
60 TABLET ORAL DAILY
Qty: 30 TABLET | Refills: 0 | Status: SHIPPED | OUTPATIENT
Start: 2025-02-20 | End: 2025-02-25

## 2025-02-20 RX ORDER — ALBUTEROL SULFATE 90 UG/1
1 INHALANT RESPIRATORY (INHALATION) EVERY 6 HOURS PRN
Qty: 18 G | Refills: 0 | Status: SHIPPED | OUTPATIENT
Start: 2025-02-20

## 2025-02-20 NOTE — ED PROVIDER NOTES
[facility] EMERGENCY DEPARTMENT  EMERGENCY DEPARTMENT ENCOUNTER        Pt Name: Nicolas Craft  MRN: [id number]  Birthdate 1970  Date of evaluation: 2/19/2025  Provider: DYLON Turcios CNP  PCP: No primary care provider on file.  Note Started: 1:57 AM EST 2/20/25      GALDINO. I have evaluated this patient.        CHIEF COMPLAINT       Chief Complaint   Patient presents with    Asthma     Patient had the flu last week and has been having congestion  and irritation with asthma        HISTORY OF PRESENT ILLNESS: 1 or more Elements     History From: patient  Limitations to history : None    Nicolas Craft is a 54 y.o. male who presents to the emergency department with complaint of chest tightness and wheezing over the past few days.  Recent history of influenza.  He does use Dulera daily and albuterol as needed.  States that his asthma is generally well-controlled.  Being out in the cold that exacerbate symptoms.  No fever or mucus production.    Denies any headache, fever, lightheadedness, dizziness, visual disturbances.  No chest pain or pressure.  No neck or back pain.  No abdominal pain, nausea, vomiting, diarrhea, constipation, or dysuria.  No rash.    Nursing Notes were all reviewed and agreed with or any disagreements were addressed in the HPI.    REVIEW OF SYSTEMS :      Review of Systems   Constitutional:  Negative for activity change, chills and fever.   Respiratory:  Positive for chest tightness and wheezing. Negative for shortness of breath.    Cardiovascular:  Negative for chest pain.   Gastrointestinal:  Negative for abdominal pain, diarrhea, nausea and vomiting.   Genitourinary:  Negative for dysuria.   All other systems reviewed and are negative.      Positives and Pertinent negatives as per HPI.     SURGICAL HISTORY     Past Surgical History:   Procedure Laterality Date    INCOMPLETE CIRCUMCISION REPAIR         CURRENTMEDICATIONS       Discharge Medication List as of

## 2025-02-21 ASSESSMENT — ENCOUNTER SYMPTOMS
VOMITING: 0
SHORTNESS OF BREATH: 0
ABDOMINAL PAIN: 0
CHEST TIGHTNESS: 1
DIARRHEA: 0
NAUSEA: 0
WHEEZING: 1

## 2025-03-06 ENCOUNTER — TELEPHONE (OUTPATIENT)
Dept: CARDIOLOGY CLINIC | Age: 55
End: 2025-03-06

## 2025-03-06 ENCOUNTER — HOSPITAL ENCOUNTER (OUTPATIENT)
Age: 55
Discharge: HOME | End: 2025-03-06
Payer: MEDICAID

## 2025-03-06 DIAGNOSIS — R06.01 ORTHOPNEA: ICD-10-CM

## 2025-03-06 DIAGNOSIS — I50.22 CHRONIC SYSTOLIC CONGESTIVE HEART FAILURE (HCC): Primary | ICD-10-CM

## 2025-03-06 DIAGNOSIS — I50.22 CHRONIC SYSTOLIC CONGESTIVE HEART FAILURE (HCC): ICD-10-CM

## 2025-03-06 LAB
ALBUMIN SERPL-MCNC: 3.9 G/DL (ref 3.4–5)
ALBUMIN/GLOB SERPL: 1.7 {RATIO} (ref 1.1–2.2)
ALP SERPL-CCNC: 62 U/L (ref 40–129)
ALT SERPL-CCNC: 38 U/L (ref 10–40)
ANION GAP SERPL CALCULATED.3IONS-SCNC: 9 MMOL/L (ref 3–16)
AST SERPL-CCNC: 26 U/L (ref 15–37)
BILIRUB SERPL-MCNC: 1.8 MG/DL (ref 0–1)
BUN SERPL-MCNC: 16 MG/DL (ref 7–20)
CALCIUM SERPL-MCNC: 9.7 MG/DL (ref 8.3–10.6)
CHLORIDE SERPL-SCNC: 106 MMOL/L (ref 99–110)
CO2 SERPL-SCNC: 27 MMOL/L (ref 21–32)
CREAT SERPL-MCNC: 0.9 MG/DL (ref 0.9–1.3)
DEPRECATED RDW RBC AUTO: 14.5 % (ref 12.4–15.4)
FERRITIN SERPL IA-MCNC: 138 NG/ML (ref 30–400)
GFR SERPLBLD CREATININE-BSD FMLA CKD-EPI: >90 ML/MIN/{1.73_M2}
GLUCOSE SERPL-MCNC: 93 MG/DL (ref 70–99)
HCT VFR BLD AUTO: 45.1 % (ref 40.5–52.5)
HGB BLD-MCNC: 15 G/DL (ref 13.5–17.5)
IRON SATN MFR SERPL: 16 % (ref 20–50)
IRON SERPL-MCNC: 56 UG/DL (ref 59–158)
MCH RBC QN AUTO: 30.5 PG (ref 26–34)
MCHC RBC AUTO-ENTMCNC: 33.2 G/DL (ref 31–36)
MCV RBC AUTO: 91.8 FL (ref 80–100)
NT-PROBNP SERPL-MCNC: 2396 PG/ML (ref 0–124)
PLATELET # BLD AUTO: 248 K/UL (ref 135–450)
PMV BLD AUTO: 8.9 FL (ref 5–10.5)
POTASSIUM SERPL-SCNC: 3.9 MMOL/L (ref 3.5–5.1)
PROT SERPL-MCNC: 6.2 G/DL (ref 6.4–8.2)
RBC # BLD AUTO: 4.91 M/UL (ref 4.2–5.9)
SODIUM SERPL-SCNC: 142 MMOL/L (ref 136–145)
TIBC SERPL-MCNC: 345 UG/DL (ref 260–445)
WBC # BLD AUTO: 11.1 K/UL (ref 4–11)

## 2025-03-06 PROCEDURE — 83540 ASSAY OF IRON: CPT

## 2025-03-06 PROCEDURE — 36415 COLL VENOUS BLD VENIPUNCTURE: CPT

## 2025-03-06 PROCEDURE — 80053 COMPREHEN METABOLIC PANEL: CPT

## 2025-03-06 PROCEDURE — 83880 ASSAY OF NATRIURETIC PEPTIDE: CPT

## 2025-03-06 PROCEDURE — 85027 COMPLETE CBC AUTOMATED: CPT

## 2025-03-06 PROCEDURE — 83550 IRON BINDING TEST: CPT

## 2025-03-06 PROCEDURE — 82728 ASSAY OF FERRITIN: CPT

## 2025-03-06 RX ORDER — SACUBITRIL AND VALSARTAN 24; 26 MG/1; MG/1
1 TABLET, FILM COATED ORAL 2 TIMES DAILY
Qty: 180 TABLET | Refills: 3 | Status: SHIPPED | OUTPATIENT
Start: 2025-03-06

## 2025-03-06 NOTE — TELEPHONE ENCOUNTER
Spoke to patient and advised. He is going to leave now for the lab to get done. I offered to have him come to office to get BP but he said he will ask the lab to do it. I advised to call if any issues. V/u and no further questions.

## 2025-03-06 NOTE — TELEPHONE ENCOUNTER
Spoke to pt he states he went to ED 2 weeks ago due Asthma and combination of flu. He said he was given Prednisone and other medication and was sent home after 2-3 hours after feeling better. He said he feel better for 3-4 days after he went to the hospital.    Pt was unable to check hi BP, HR and Oxygen. He has no chest pain, no palpitations. He said he feels fine when he is walking or sitting but When going up the stair he starts to breathe extra and when he is laying down for 5 minutes he starts to have shortness of breath and has to take 2-3 deep breaths and feel okay. He also said his sleep is getting interrupted. Symptoms started Monday.    Pt called again and I asked him if the symptoms are getting worst he states he is feeling okay. Pt wants to know WAK advise.

## 2025-03-06 NOTE — TELEPHONE ENCOUNTER
Patient called in to report current /83. He did go and get the tests Dr. Lepe ordered completed, he is just waiting on the new medication to be sent to pharmacy.

## 2025-03-06 NOTE — TELEPHONE ENCOUNTER
Patient was sick with the flu and received breathing treatments. He improved, and is better. However, he has noticed a new symptom.    When laying down, he gets short of breath and can't take a satisfying breath. Its not constant, but every few minis he feels like he needs to get a good breath in.     He is worried this is heart related.     Please reach out to advise if would like to see patient sooner than July. Currently scheduled for 07/08 @ [facility].

## 2025-03-06 NOTE — TELEPHONE ENCOUNTER
Spoke to patient, he is \"not feeling horrible\", he gets SOB while laying which is new, with stairs a little but thinks this is unchanged. Can walk around house and outside fine. Has been sitting at his deck working without issue.     He went to hospital in Feb, got breathing tx and prednisone, was fine for a few days and then started feeling the same again. Referral to pulmonary from ED but didn't schedule.    Mother had copd and was worried and wanted [facility] opinion.     Currently taking a break smoking marijuana for a bit. Felt it was the smart thing to do.     Entresto- pt does not ever remember getting this.

## 2025-03-06 NOTE — TELEPHONE ENCOUNTER
Spoke to patient and advised. Script sent to verified pharmacy. Medication discussed with pt. V/u and no further questions at this time. Advised that ERMIAS Ocasio will likely call him tomorrow with anything additional based on his lab results and his current symptoms.

## 2025-03-07 RX ORDER — TORSEMIDE 20 MG/1
TABLET ORAL
Qty: 30 TABLET | Refills: 0 | Status: SHIPPED | OUTPATIENT
Start: 2025-03-07

## 2025-03-07 NOTE — TELEPHONE ENCOUNTER
YARED Galan. Dr. Ahuja recommended following up with him Monday which I will do but just wanted to keep you in the loop.

## 2025-03-07 NOTE — TELEPHONE ENCOUNTER
NYHA I. Last EF 45-50% (improved) 9/2024 w/ grade I DD.    NT Pro-BNP 2396 from 3/6/25.   Pro- from 4/17/24;  1513 from 4/10/24.    Discussed with Dr. Ahuja> Recommend taking torsemide 20mg today and tomorrow morning. Keep some on hand for prn use.     Nicolas is agreeable to the plan and will pick the med up this afternoon. Rx sent in for torsemide 20mg #30. Nicolas states undertanding of the diuretic effect. Creat is normal.

## 2025-03-07 NOTE — TELEPHONE ENCOUNTER
Went over lab results with Nicolas. BNP is elevated. He reports he has still been losing weight on semaglutide. He has puffiness on the tops of his feet over the last few days. Denies leg swelling, abd swelling, cough. He has ongoing orthopnea which frustrates him as he has difficulty falling asleep.     He has picked up the Entresto but has not taken it yet. I advised him to take a dose now and again before bed, then  bid morning and bedtime.    I went over worsening symptoms of when to contact our office or go to the ER such as sudden weight gain, acutely worsening SOB or swelling in legs/abdomen. He stated understanding.

## 2025-03-10 ENCOUNTER — RESULTS FOLLOW-UP (OUTPATIENT)
Dept: CARDIOLOGY CLINIC | Age: 55
End: 2025-03-10

## 2025-03-11 DIAGNOSIS — D50.8 IRON DEFICIENCY ANEMIA SECONDARY TO INADEQUATE DIETARY IRON INTAKE: Primary | ICD-10-CM

## 2025-03-11 RX ORDER — SODIUM CHLORIDE 9 MG/ML
5-250 INJECTION, SOLUTION INTRAVENOUS PRN
Status: CANCELLED | OUTPATIENT
Start: 2025-03-11

## 2025-03-11 RX ORDER — SODIUM CHLORIDE 0.9 % (FLUSH) 0.9 %
5-40 SYRINGE (ML) INJECTION PRN
Status: CANCELLED | OUTPATIENT
Start: 2025-03-11

## 2025-03-12 ENCOUNTER — PATIENT MESSAGE (OUTPATIENT)
Dept: CARDIOLOGY CLINIC | Age: 55
End: 2025-03-12

## 2025-03-13 ENCOUNTER — HOSPITAL ENCOUNTER (OUTPATIENT)
Dept: ONCOLOGY | Age: 55
Setting detail: INFUSION SERIES
Discharge: HOME | End: 2025-03-13
Payer: MEDICAID

## 2025-03-13 VITALS
DIASTOLIC BLOOD PRESSURE: 56 MMHG | TEMPERATURE: 97 F | SYSTOLIC BLOOD PRESSURE: 102 MMHG | RESPIRATION RATE: 20 BRPM | HEART RATE: 110 BPM

## 2025-03-13 DIAGNOSIS — D50.8 IRON DEFICIENCY ANEMIA SECONDARY TO INADEQUATE DIETARY IRON INTAKE: Primary | ICD-10-CM

## 2025-03-13 DIAGNOSIS — I50.22 CHRONIC SYSTOLIC CONGESTIVE HEART FAILURE (HCC): ICD-10-CM

## 2025-03-13 PROCEDURE — 6360000002 HC RX W HCPCS: Performed by: INTERNAL MEDICINE

## 2025-03-13 PROCEDURE — 99211 OFF/OP EST MAY X REQ PHY/QHP: CPT

## 2025-03-13 PROCEDURE — 2500000003 HC RX 250 WO HCPCS: Performed by: INTERNAL MEDICINE

## 2025-03-13 PROCEDURE — 96365 THER/PROPH/DIAG IV INF INIT: CPT

## 2025-03-13 PROCEDURE — 2580000003 HC RX 258: Performed by: INTERNAL MEDICINE

## 2025-03-13 PROCEDURE — 96367 TX/PROPH/DG ADDL SEQ IV INF: CPT

## 2025-03-13 RX ORDER — SODIUM CHLORIDE 9 MG/ML
5-250 INJECTION, SOLUTION INTRAVENOUS PRN
Status: DISCONTINUED | OUTPATIENT
Start: 2025-03-13 | End: 2025-03-14 | Stop reason: HOSPADM

## 2025-03-13 RX ORDER — SODIUM CHLORIDE 0.9 % (FLUSH) 0.9 %
5-40 SYRINGE (ML) INJECTION PRN
Status: CANCELLED | OUTPATIENT
Start: 2025-03-20

## 2025-03-13 RX ORDER — SODIUM CHLORIDE 9 MG/ML
5-250 INJECTION, SOLUTION INTRAVENOUS PRN
Status: CANCELLED | OUTPATIENT
Start: 2025-03-20

## 2025-03-13 RX ORDER — SODIUM CHLORIDE 0.9 % (FLUSH) 0.9 %
5-40 SYRINGE (ML) INJECTION PRN
Status: DISCONTINUED | OUTPATIENT
Start: 2025-03-13 | End: 2025-03-14 | Stop reason: HOSPADM

## 2025-03-13 RX ADMIN — SODIUM CHLORIDE 125 MG: 9 INJECTION, SOLUTION INTRAVENOUS at 08:42

## 2025-03-13 RX ADMIN — SODIUM CHLORIDE, PRESERVATIVE FREE 10 ML: 5 INJECTION INTRAVENOUS at 08:43

## 2025-03-13 RX ADMIN — SODIUM CHLORIDE 200 ML/HR: 9 INJECTION, SOLUTION INTRAVENOUS at 08:42

## 2025-03-13 NOTE — PROGRESS NOTES
Patient ambulatory to department for first of five doses of ferrlecit. Tolerated ferrlecit infusion well with no adverse rx noted.Given avs with information about ferrlecit. Verbally told about most common possible side effects. To return next week for next infusion.

## 2025-03-20 ENCOUNTER — HOSPITAL ENCOUNTER (OUTPATIENT)
Dept: ONCOLOGY | Age: 55
Setting detail: INFUSION SERIES
Discharge: HOME | End: 2025-03-20
Payer: MEDICAID

## 2025-03-20 VITALS
SYSTOLIC BLOOD PRESSURE: 105 MMHG | HEART RATE: 113 BPM | DIASTOLIC BLOOD PRESSURE: 91 MMHG | RESPIRATION RATE: 16 BRPM | TEMPERATURE: 97 F

## 2025-03-20 DIAGNOSIS — I50.22 CHRONIC SYSTOLIC CONGESTIVE HEART FAILURE (HCC): ICD-10-CM

## 2025-03-20 DIAGNOSIS — D50.8 IRON DEFICIENCY ANEMIA SECONDARY TO INADEQUATE DIETARY IRON INTAKE: Primary | ICD-10-CM

## 2025-03-20 PROCEDURE — 99211 OFF/OP EST MAY X REQ PHY/QHP: CPT

## 2025-03-20 PROCEDURE — 2580000003 HC RX 258: Performed by: INTERNAL MEDICINE

## 2025-03-20 PROCEDURE — 96365 THER/PROPH/DIAG IV INF INIT: CPT

## 2025-03-20 PROCEDURE — 6360000002 HC RX W HCPCS: Performed by: INTERNAL MEDICINE

## 2025-03-20 RX ORDER — SODIUM CHLORIDE 9 MG/ML
5-250 INJECTION, SOLUTION INTRAVENOUS PRN
Status: DISCONTINUED | OUTPATIENT
Start: 2025-03-20 | End: 2025-03-21 | Stop reason: HOSPADM

## 2025-03-20 RX ORDER — SODIUM CHLORIDE 0.9 % (FLUSH) 0.9 %
5-40 SYRINGE (ML) INJECTION PRN
Status: DISCONTINUED | OUTPATIENT
Start: 2025-03-20 | End: 2025-03-21 | Stop reason: HOSPADM

## 2025-03-20 RX ORDER — SODIUM CHLORIDE 9 MG/ML
5-250 INJECTION, SOLUTION INTRAVENOUS PRN
Status: CANCELLED | OUTPATIENT
Start: 2025-03-27

## 2025-03-20 RX ORDER — SODIUM CHLORIDE 0.9 % (FLUSH) 0.9 %
5-40 SYRINGE (ML) INJECTION PRN
Status: CANCELLED | OUTPATIENT
Start: 2025-03-27

## 2025-03-20 RX ADMIN — SODIUM CHLORIDE 220 ML/HR: 9 INJECTION, SOLUTION INTRAVENOUS at 11:18

## 2025-03-20 RX ADMIN — SODIUM CHLORIDE 125 MG: 9 INJECTION, SOLUTION INTRAVENOUS at 11:20

## 2025-03-20 NOTE — PROGRESS NOTES
Patient ambulatory to department for second of five doses of ferrlecit. Tolerated ferrlecit infusion well with no adverse rx noted.Given avs with information about ferrlecit. Verbally told about most common possible side effects. To return next week for next infusion.

## 2025-03-27 ENCOUNTER — HOSPITAL ENCOUNTER (OUTPATIENT)
Dept: ONCOLOGY | Age: 55
Setting detail: INFUSION SERIES
Discharge: HOME | End: 2025-03-27
Payer: MEDICAID

## 2025-03-27 VITALS — DIASTOLIC BLOOD PRESSURE: 93 MMHG | RESPIRATION RATE: 16 BRPM | HEART RATE: 76 BPM | SYSTOLIC BLOOD PRESSURE: 118 MMHG

## 2025-03-27 DIAGNOSIS — I50.22 CHRONIC SYSTOLIC CONGESTIVE HEART FAILURE (HCC): ICD-10-CM

## 2025-03-27 DIAGNOSIS — D50.8 IRON DEFICIENCY ANEMIA SECONDARY TO INADEQUATE DIETARY IRON INTAKE: Primary | ICD-10-CM

## 2025-03-27 PROCEDURE — 96365 THER/PROPH/DIAG IV INF INIT: CPT

## 2025-03-27 PROCEDURE — 6360000002 HC RX W HCPCS: Performed by: INTERNAL MEDICINE

## 2025-03-27 PROCEDURE — 2580000003 HC RX 258: Performed by: INTERNAL MEDICINE

## 2025-03-27 PROCEDURE — 99211 OFF/OP EST MAY X REQ PHY/QHP: CPT

## 2025-03-27 RX ORDER — SODIUM CHLORIDE 9 MG/ML
5-250 INJECTION, SOLUTION INTRAVENOUS PRN
Status: DISCONTINUED | OUTPATIENT
Start: 2025-03-27 | End: 2025-03-28 | Stop reason: HOSPADM

## 2025-03-27 RX ORDER — SODIUM CHLORIDE 0.9 % (FLUSH) 0.9 %
5-40 SYRINGE (ML) INJECTION PRN
Status: DISCONTINUED | OUTPATIENT
Start: 2025-03-27 | End: 2025-03-28 | Stop reason: HOSPADM

## 2025-03-27 RX ORDER — SODIUM CHLORIDE 9 MG/ML
5-250 INJECTION, SOLUTION INTRAVENOUS PRN
Status: CANCELLED | OUTPATIENT
Start: 2025-04-03

## 2025-03-27 RX ORDER — SODIUM CHLORIDE 0.9 % (FLUSH) 0.9 %
5-40 SYRINGE (ML) INJECTION PRN
Status: CANCELLED | OUTPATIENT
Start: 2025-04-03

## 2025-03-27 RX ADMIN — SODIUM CHLORIDE 125 MG: 9 INJECTION, SOLUTION INTRAVENOUS at 11:08

## 2025-03-27 RX ADMIN — SODIUM CHLORIDE 120 ML/HR: 9 INJECTION, SOLUTION INTRAVENOUS at 11:08

## 2025-03-27 NOTE — PROGRESS NOTES
Patient ambulatory to department for third of five doses of ferrlecit. Tolerated ferrlecit infusion well with no adverse rx noted.pt denied avs with information about ferrlecit. Verbally told about most common possible side effects. To return next week for next infusion.

## 2025-04-03 ENCOUNTER — HOSPITAL ENCOUNTER (OUTPATIENT)
Dept: ONCOLOGY | Age: 55
Setting detail: INFUSION SERIES
Discharge: HOME | End: 2025-04-03
Payer: MEDICAID

## 2025-04-03 VITALS
TEMPERATURE: 96.9 F | SYSTOLIC BLOOD PRESSURE: 106 MMHG | DIASTOLIC BLOOD PRESSURE: 85 MMHG | RESPIRATION RATE: 16 BRPM | HEART RATE: 75 BPM

## 2025-04-03 DIAGNOSIS — D50.8 IRON DEFICIENCY ANEMIA SECONDARY TO INADEQUATE DIETARY IRON INTAKE: Primary | ICD-10-CM

## 2025-04-03 DIAGNOSIS — I50.22 CHRONIC SYSTOLIC CONGESTIVE HEART FAILURE (HCC): ICD-10-CM

## 2025-04-03 PROCEDURE — 6360000002 HC RX W HCPCS: Performed by: INTERNAL MEDICINE

## 2025-04-03 PROCEDURE — 99211 OFF/OP EST MAY X REQ PHY/QHP: CPT

## 2025-04-03 PROCEDURE — 2580000003 HC RX 258: Performed by: INTERNAL MEDICINE

## 2025-04-03 PROCEDURE — 2500000003 HC RX 250 WO HCPCS: Performed by: INTERNAL MEDICINE

## 2025-04-03 PROCEDURE — 96365 THER/PROPH/DIAG IV INF INIT: CPT

## 2025-04-03 RX ORDER — SODIUM CHLORIDE 9 MG/ML
5-250 INJECTION, SOLUTION INTRAVENOUS PRN
OUTPATIENT
Start: 2025-04-10

## 2025-04-03 RX ORDER — SODIUM CHLORIDE 9 MG/ML
5-250 INJECTION, SOLUTION INTRAVENOUS PRN
Status: DISCONTINUED | OUTPATIENT
Start: 2025-04-03 | End: 2025-04-04 | Stop reason: HOSPADM

## 2025-04-03 RX ORDER — SODIUM CHLORIDE 0.9 % (FLUSH) 0.9 %
5-40 SYRINGE (ML) INJECTION PRN
Status: DISCONTINUED | OUTPATIENT
Start: 2025-04-03 | End: 2025-04-04 | Stop reason: HOSPADM

## 2025-04-03 RX ORDER — SODIUM CHLORIDE 0.9 % (FLUSH) 0.9 %
5-40 SYRINGE (ML) INJECTION PRN
OUTPATIENT
Start: 2025-04-10

## 2025-04-03 RX ADMIN — SODIUM CHLORIDE 110 ML/HR: 9 INJECTION, SOLUTION INTRAVENOUS at 10:55

## 2025-04-03 RX ADMIN — SODIUM CHLORIDE 125 MG: 9 INJECTION, SOLUTION INTRAVENOUS at 10:56

## 2025-04-03 RX ADMIN — Medication 10 ML: at 10:55

## 2025-04-03 NOTE — PROGRESS NOTES
Patient ambulatory to department for fourth  of five doses of ferrlecit. Tolerated ferrlecit infusion well with no adverse rx noted.pt denied avs with information about ferrlecit. Verbally told about most common possible side effects. To return next week for next infusion.

## 2025-04-10 ENCOUNTER — HOSPITAL ENCOUNTER (OUTPATIENT)
Dept: ONCOLOGY | Age: 55
Setting detail: INFUSION SERIES
Discharge: HOME | End: 2025-04-10
Payer: MEDICAID

## 2025-04-10 VITALS
DIASTOLIC BLOOD PRESSURE: 60 MMHG | SYSTOLIC BLOOD PRESSURE: 94 MMHG | HEART RATE: 81 BPM | TEMPERATURE: 97.6 F | RESPIRATION RATE: 16 BRPM

## 2025-04-10 DIAGNOSIS — I50.22 CHRONIC SYSTOLIC CONGESTIVE HEART FAILURE (HCC): ICD-10-CM

## 2025-04-10 DIAGNOSIS — D50.8 IRON DEFICIENCY ANEMIA SECONDARY TO INADEQUATE DIETARY IRON INTAKE: Primary | ICD-10-CM

## 2025-04-10 PROCEDURE — 99211 OFF/OP EST MAY X REQ PHY/QHP: CPT

## 2025-04-10 PROCEDURE — 6360000002 HC RX W HCPCS: Performed by: INTERNAL MEDICINE

## 2025-04-10 PROCEDURE — 2580000003 HC RX 258: Performed by: INTERNAL MEDICINE

## 2025-04-10 PROCEDURE — 96365 THER/PROPH/DIAG IV INF INIT: CPT

## 2025-04-10 RX ORDER — SODIUM CHLORIDE 9 MG/ML
5-250 INJECTION, SOLUTION INTRAVENOUS PRN
Status: CANCELLED | OUTPATIENT
Start: 2025-04-10

## 2025-04-10 RX ORDER — SODIUM CHLORIDE 9 MG/ML
5-250 INJECTION, SOLUTION INTRAVENOUS PRN
Status: DISCONTINUED | OUTPATIENT
Start: 2025-04-10 | End: 2025-04-11 | Stop reason: HOSPADM

## 2025-04-10 RX ORDER — SODIUM CHLORIDE 0.9 % (FLUSH) 0.9 %
5-40 SYRINGE (ML) INJECTION PRN
Status: DISCONTINUED | OUTPATIENT
Start: 2025-04-10 | End: 2025-04-11 | Stop reason: HOSPADM

## 2025-04-10 RX ORDER — SODIUM CHLORIDE 0.9 % (FLUSH) 0.9 %
5-40 SYRINGE (ML) INJECTION PRN
Status: CANCELLED | OUTPATIENT
Start: 2025-04-10

## 2025-04-10 RX ADMIN — SODIUM CHLORIDE 220 ML/HR: 9 INJECTION, SOLUTION INTRAVENOUS at 10:59

## 2025-04-10 RX ADMIN — SODIUM CHLORIDE 125 MG: 9 INJECTION, SOLUTION INTRAVENOUS at 11:00

## 2025-04-10 NOTE — FLOWSHEET NOTE
Encouraged patient to reach out to his provider to see when he needs follow up labs from Iron infusions. Patient verbalized understanding.

## 2025-04-10 NOTE — FLOWSHEET NOTE
Patient to infusion center for Ferrlecit. Tolerated infusion well with no adverse side effects noted. Denies need for printed AVS. Will follow up as needed and send his doctor a message through MY Chart. If no response through MY Chart he will call. Discharged home ambulatory.

## 2025-04-15 DIAGNOSIS — E66.813 CLASS 3 SEVERE OBESITY DUE TO EXCESS CALORIES WITH SERIOUS COMORBIDITY AND BODY MASS INDEX (BMI) OF 60.0 TO 69.9 IN ADULT: ICD-10-CM

## 2025-04-15 NOTE — TELEPHONE ENCOUNTER
Medication Refill    Medication needing refilled: Semaglutide, 1 MG/DOSE, 2 MG/1.5ML SOPN     Dosage of the medication:    How are you taking this medication (QD, BID, TID, QID, PRN): Inject 2.4 mg into the skin once a week    30 or 90 day supply called in:    When will you run out of your medication:     Which Pharmacy are we sending the medication to?: [facility] [address] [phone] - F [phone]       Patient asks if there is an issue with refilling to please call him [phone]

## 2025-04-15 NOTE — TELEPHONE ENCOUNTER
Requested Prescriptions     Pending Prescriptions Disp Refills    Semaglutide, 1 MG/DOSE, 2 MG/1.5ML SOPN 4 Adjustable Dose Pre-filled Pen Syringe 3     Sig: Inject 2.4 mg into the skin once a week      Last OV 01/16/2025    Next OV 07/08/2025

## 2025-04-16 NOTE — TELEPHONE ENCOUNTER
Spoke with patient he stated he had recent labs 5 weeks ago. Patient takes the medication on Wed so today is the last dose he has available.  Per patient is doing very good on the medication. Please call patient with updates about refill.

## 2025-04-16 NOTE — TELEPHONE ENCOUNTER
I called Nicolas to find out if he has had a hemoglobin A1c drawn recently and to see how many doses that he has left.  No answer, voicemail left requesting a return call.

## 2025-05-02 ENCOUNTER — TELEPHONE (OUTPATIENT)
Dept: CARDIOLOGY CLINIC | Age: 55
End: 2025-05-02

## 2025-05-02 DIAGNOSIS — D50.8 IRON DEFICIENCY ANEMIA SECONDARY TO INADEQUATE DIETARY IRON INTAKE: Primary | ICD-10-CM

## 2025-05-02 NOTE — TELEPHONE ENCOUNTER
Patient is requesting labs being placed to check iron levels. He had an infusion recently, 5 weeks of.     He is also requesting a call regarding the:  Semaglutide, 1 MG/DOSE, 2 MG/1.5ML SOPN [[id number]]

## 2025-05-02 NOTE — TELEPHONE ENCOUNTER
Orders placed for iron studies. Pt advised. Also advised that [facility] is no longer able to prescribe the weight loss medications. Discussed options with pt to speak to pcp for script and go through [facility] or go through weight management. Pt v/u and has no further questions at this time.

## 2025-05-02 NOTE — TELEPHONE ENCOUNTER
Spoke with patient,  he stated that he was supposed to get his Iron level checked after having the Iron infusions.     In a previous message JUVENCIO had stated they would like a Hba1c drawn before refilling his Semaglutide at   [facility]"s.

## 2025-06-03 ENCOUNTER — TELEPHONE (OUTPATIENT)
Dept: CARDIOLOGY CLINIC | Age: 55
End: 2025-06-03

## 2025-06-03 DIAGNOSIS — J45.909 MODERATE ASTHMA, UNSPECIFIED WHETHER COMPLICATED, UNSPECIFIED WHETHER PERSISTENT: ICD-10-CM

## 2025-06-03 RX ORDER — ALBUTEROL SULFATE 90 UG/1
1 INHALANT RESPIRATORY (INHALATION) EVERY 6 HOURS PRN
Qty: 18 G | Refills: 0 | Status: SHIPPED | OUTPATIENT
Start: 2025-06-03

## 2025-06-03 NOTE — TELEPHONE ENCOUNTER
Pt called stating DR HENNING was filling the RX until they found a PCP:  Pt has a future appt with a PCP however they will not have enough medications until then.    albuterol sulfate HFA (PROVENTIL;VENTOLIN;PROAIR) 108 (90 Base) MCG/ACT inhaler  mometasone-formoterol (DULERA) 200-5 MCG/ACT inhaler     [address]

## 2025-06-09 NOTE — PROGRESS NOTES
[facility]      CONSULTATION  [phone]  7/8/25  Referring:  Shant Best MD (PCP)    REASON FOR CONSULT/CHIEF COMPLAINT/HPI     Reason for visit/ Chief complaint  CHF, atrial fibrillation, obesity     HPI Nicolas Craft is a 55 y.o. male patient here for follow up.     I met him in the hospital in April 2024 for new onset Atrial Fibrillation.  I cardioverted him after RONY on 4/17/2024.  He wore a monitor prior to and after cardioversion.  He remained in sinus for the last 3 weeks of the monitor.  LVEF by RONY was 35-40%.  It was 40% by TTE in 2021.     [id number] Score for Atrial Fibrillation Stroke Risk    Risk   Factors   Component Value   C CHF Yes 1   H HTN Yes 1   A2 Age >= 75 No,  (53 y.o.) 0   D DM No 0   S2 Prior Stroke/TIA No 0   V Vascular Disease No 0   A Age 65-74 No,  (53 y.o.) 0   Sc Sex male 0     [id number]  Score   2     He does not smoke cigarettes, smokes marijuana occasionally, does not drink alcohol.  He works as a voice actor.     He continues to lose weight while on Ozempic.     He is down over 100# since starting Ozempic.  He feels well and exercises frequently.      Today he feels well.  He was able to find a new supplier for compounded Semaglutide for a year supply just prior to the FDA ending the official shortage.  He is concerned that his weight has plateau'd.  He gets occasional dyspnea and leg swelling, but torsemide has really helped that.   He is only taking it prn currently, about 2-3 times a week.  He notes he sometimes wakes up at 3 am and has swelling and orthopnea, but doesn't want to take torsemide then since he won't be able to sleep.  No palpitations or dizziness.    Patient is adherent with medications and is tolerating them well without side effects     HISTORY/ALLERGIES/ROS     MedHx:  has a past medical history of Arrhythmia, Asthma, Atrial fibrillation (HCC), Congenital heart disease, Depression, Hx of blood clots, and Hypertension.  SurgHx:   [Follow-Up] : a follow-up evaluation of

## 2025-07-07 SDOH — HEALTH STABILITY: PHYSICAL HEALTH: ON AVERAGE, HOW MANY DAYS PER WEEK DO YOU ENGAGE IN MODERATE TO STRENUOUS EXERCISE (LIKE A BRISK WALK)?: 2 DAYS

## 2025-07-07 SDOH — HEALTH STABILITY: PHYSICAL HEALTH: ON AVERAGE, HOW MANY MINUTES DO YOU ENGAGE IN EXERCISE AT THIS LEVEL?: 30 MIN

## 2025-07-08 ENCOUNTER — OFFICE VISIT (OUTPATIENT)
Dept: CARDIOLOGY CLINIC | Age: 55
End: 2025-07-08
Payer: MEDICAID

## 2025-07-08 ENCOUNTER — OFFICE VISIT (OUTPATIENT)
Dept: PRIMARY CARE CLINIC | Age: 55
End: 2025-07-08
Payer: MEDICAID

## 2025-07-08 VITALS
DIASTOLIC BLOOD PRESSURE: 72 MMHG | WEIGHT: 315 LBS | BODY MASS INDEX: 39.17 KG/M2 | HEIGHT: 75 IN | SYSTOLIC BLOOD PRESSURE: 118 MMHG | OXYGEN SATURATION: 99 % | HEART RATE: 83 BPM

## 2025-07-08 VITALS
SYSTOLIC BLOOD PRESSURE: 118 MMHG | BODY MASS INDEX: 39.17 KG/M2 | HEART RATE: 83 BPM | HEIGHT: 75 IN | WEIGHT: 315 LBS | DIASTOLIC BLOOD PRESSURE: 72 MMHG | RESPIRATION RATE: 18 BRPM | OXYGEN SATURATION: 98 %

## 2025-07-08 DIAGNOSIS — I48.91 ATRIAL FIBRILLATION, UNSPECIFIED TYPE (HCC): ICD-10-CM

## 2025-07-08 DIAGNOSIS — I48.0 PAROXYSMAL ATRIAL FIBRILLATION (HCC): ICD-10-CM

## 2025-07-08 DIAGNOSIS — Z12.11 COLON CANCER SCREENING: ICD-10-CM

## 2025-07-08 DIAGNOSIS — E66.01 MORBID OBESITY DUE TO EXCESS CALORIES (HCC): ICD-10-CM

## 2025-07-08 DIAGNOSIS — J45.909 MODERATE ASTHMA, UNSPECIFIED WHETHER COMPLICATED, UNSPECIFIED WHETHER PERSISTENT: ICD-10-CM

## 2025-07-08 DIAGNOSIS — Z00.00 ENCOUNTER FOR ROUTINE ADULT HEALTH EXAMINATION WITHOUT ABNORMAL FINDINGS: Primary | ICD-10-CM

## 2025-07-08 DIAGNOSIS — R06.83 SNORING: ICD-10-CM

## 2025-07-08 DIAGNOSIS — I25.10 CORONARY ARTERY DISEASE INVOLVING NATIVE CORONARY ARTERY OF NATIVE HEART WITHOUT ANGINA PECTORIS: ICD-10-CM

## 2025-07-08 DIAGNOSIS — I50.22 CHRONIC SYSTOLIC CONGESTIVE HEART FAILURE (HCC): Primary | ICD-10-CM

## 2025-07-08 DIAGNOSIS — I10 ESSENTIAL HYPERTENSION: ICD-10-CM

## 2025-07-08 DIAGNOSIS — I42.9 CARDIOMYOPATHY, UNSPECIFIED TYPE (HCC): ICD-10-CM

## 2025-07-08 PROCEDURE — 99204 OFFICE O/P NEW MOD 45 MIN: CPT | Performed by: STUDENT IN AN ORGANIZED HEALTH CARE EDUCATION/TRAINING PROGRAM

## 2025-07-08 PROCEDURE — G8427 DOCREV CUR MEDS BY ELIG CLIN: HCPCS | Performed by: INTERNAL MEDICINE

## 2025-07-08 PROCEDURE — G8427 DOCREV CUR MEDS BY ELIG CLIN: HCPCS | Performed by: STUDENT IN AN ORGANIZED HEALTH CARE EDUCATION/TRAINING PROGRAM

## 2025-07-08 PROCEDURE — G8417 CALC BMI ABV UP PARAM F/U: HCPCS | Performed by: STUDENT IN AN ORGANIZED HEALTH CARE EDUCATION/TRAINING PROGRAM

## 2025-07-08 PROCEDURE — 3074F SYST BP LT 130 MM HG: CPT | Performed by: STUDENT IN AN ORGANIZED HEALTH CARE EDUCATION/TRAINING PROGRAM

## 2025-07-08 PROCEDURE — G8417 CALC BMI ABV UP PARAM F/U: HCPCS | Performed by: INTERNAL MEDICINE

## 2025-07-08 PROCEDURE — 3017F COLORECTAL CA SCREEN DOC REV: CPT | Performed by: INTERNAL MEDICINE

## 2025-07-08 PROCEDURE — 1036F TOBACCO NON-USER: CPT | Performed by: STUDENT IN AN ORGANIZED HEALTH CARE EDUCATION/TRAINING PROGRAM

## 2025-07-08 PROCEDURE — 3074F SYST BP LT 130 MM HG: CPT | Performed by: INTERNAL MEDICINE

## 2025-07-08 PROCEDURE — 3017F COLORECTAL CA SCREEN DOC REV: CPT | Performed by: STUDENT IN AN ORGANIZED HEALTH CARE EDUCATION/TRAINING PROGRAM

## 2025-07-08 PROCEDURE — 3078F DIAST BP <80 MM HG: CPT | Performed by: STUDENT IN AN ORGANIZED HEALTH CARE EDUCATION/TRAINING PROGRAM

## 2025-07-08 PROCEDURE — 99214 OFFICE O/P EST MOD 30 MIN: CPT | Performed by: INTERNAL MEDICINE

## 2025-07-08 PROCEDURE — 99386 PREV VISIT NEW AGE 40-64: CPT | Performed by: STUDENT IN AN ORGANIZED HEALTH CARE EDUCATION/TRAINING PROGRAM

## 2025-07-08 PROCEDURE — 3078F DIAST BP <80 MM HG: CPT | Performed by: INTERNAL MEDICINE

## 2025-07-08 PROCEDURE — 1036F TOBACCO NON-USER: CPT | Performed by: INTERNAL MEDICINE

## 2025-07-08 RX ORDER — ALBUTEROL SULFATE 90 UG/1
1 INHALANT RESPIRATORY (INHALATION) EVERY 6 HOURS PRN
Qty: 18 G | Refills: 0 | Status: SHIPPED | OUTPATIENT
Start: 2025-07-08

## 2025-07-08 SDOH — ECONOMIC STABILITY: FOOD INSECURITY: WITHIN THE PAST 12 MONTHS, THE FOOD YOU BOUGHT JUST DIDN'T LAST AND YOU DIDN'T HAVE MONEY TO GET MORE.: NEVER TRUE

## 2025-07-08 SDOH — ECONOMIC STABILITY: FOOD INSECURITY: WITHIN THE PAST 12 MONTHS, YOU WORRIED THAT YOUR FOOD WOULD RUN OUT BEFORE YOU GOT MONEY TO BUY MORE.: NEVER TRUE

## 2025-07-08 ASSESSMENT — ENCOUNTER SYMPTOMS
SHORTNESS OF BREATH: 0
COUGH: 0
ABDOMINAL PAIN: 0
NAUSEA: 0
DIARRHEA: 0
VOMITING: 0

## 2025-07-08 ASSESSMENT — PATIENT HEALTH QUESTIONNAIRE - PHQ9
8. MOVING OR SPEAKING SO SLOWLY THAT OTHER PEOPLE COULD HAVE NOTICED. OR THE OPPOSITE, BEING SO FIGETY OR RESTLESS THAT YOU HAVE BEEN MOVING AROUND A LOT MORE THAN USUAL: NOT AT ALL
SUM OF ALL RESPONSES TO PHQ QUESTIONS 1-9: 8
10. IF YOU CHECKED OFF ANY PROBLEMS, HOW DIFFICULT HAVE THESE PROBLEMS MADE IT FOR YOU TO DO YOUR WORK, TAKE CARE OF THINGS AT HOME, OR GET ALONG WITH OTHER PEOPLE: NOT DIFFICULT AT ALL
9. THOUGHTS THAT YOU WOULD BE BETTER OFF DEAD, OR OF HURTING YOURSELF: NOT AT ALL
7. TROUBLE CONCENTRATING ON THINGS, SUCH AS READING THE NEWSPAPER OR WATCHING TELEVISION: NOT AT ALL
2. FEELING DOWN, DEPRESSED OR HOPELESS: SEVERAL DAYS
1. LITTLE INTEREST OR PLEASURE IN DOING THINGS: SEVERAL DAYS
3. TROUBLE FALLING OR STAYING ASLEEP: MORE THAN HALF THE DAYS
SUM OF ALL RESPONSES TO PHQ QUESTIONS 1-9: 8
6. FEELING BAD ABOUT YOURSELF - OR THAT YOU ARE A FAILURE OR HAVE LET YOURSELF OR YOUR FAMILY DOWN: MORE THAN HALF THE DAYS
5. POOR APPETITE OR OVEREATING: SEVERAL DAYS
4. FEELING TIRED OR HAVING LITTLE ENERGY: SEVERAL DAYS

## 2025-07-08 NOTE — ASSESSMENT & PLAN NOTE
Stable  - ECHO 9/2024 showed Reduced left ventricular systolic function with a visually estimated EF of 45 - 50%. EF by 2D Simpsons Biplane is 45%. Left ventricle is dilated. Mildly increased wall thickness. Unable to assess wall motion. Grade I diastolic dysfunction with normal LAP. Average E/e' ratio is 10.63.   Plan  - Continue following with Cardiology  - Continue Entresto, metoprolol, torsemide

## 2025-07-08 NOTE — ASSESSMENT & PLAN NOTE
Patient reports significant weight loss while on semaglutide and continues to work on diet and exercise  - Consider transition to Zepbound if SALVATORE is confirmed

## 2025-07-08 NOTE — PATIENT INSTRUCTIONS
Take scheduled torsemide when you first wake up Monday, Wednesday, Friday, ok to take an extra dose as needed the other days.    If you feel really swollen take an extra dose the same day (and let us know)    New labs soon    Follow up 6 months at [facility] w/ new echocardiogram

## 2025-07-08 NOTE — ASSESSMENT & PLAN NOTE
- BP today: 118/72, Goal BP: <140/90 per JNC8 Guidelines  - BP Log: No  - Current medication regimen: entresto, metoprolol, amlodipine  - Diet/Exercise: No  - Tobacco Use: No  - Labs Reviewed:  BMP:   Lab Results   Component Value Date/Time     03/06/2025 01:21 PM    K 3.9 03/06/2025 01:21 PM    K 3.5 04/10/2024 07:43 AM     03/06/2025 01:21 PM    CO2 27 03/06/2025 01:21 PM    BUN 16 03/06/2025 01:21 PM    CREATININE 0.9 03/06/2025 01:21 PM    GLUCOSE 93 03/06/2025 01:21 PM    CALCIUM 9.7 03/06/2025 01:21 PM      Micro/Creatinine: No results found for: \"ALBUMINUR\", \"LABCREAU\", \"ALBCREAT\"  Lipid:   Lab Results   Component Value Date/Time    TRIG 96 11/17/2021 05:23 AM    HDL 39 04/10/2024 07:43 AM     Plan  - Continue following with Cardiology  - Continue Entresto, amlodipne, metoprolol

## 2025-07-08 NOTE — ASSESSMENT & PLAN NOTE
Cancer Screenings  Colorectal Cancer  Start at (45)-50 years and continuing until age 75  - High-sensitivity guaiac FOBT every year  - Stool DNA-FIT (Cologuard) every 3 years  - Flexible sigmoidoscopy every 5 years  - Colonoscopy every 10 years  Cologuard ordered    Vaccinations  Influenza vaccine:  recommended every fall, Shingles vaccine:  Recommended patient get done at pharmacy    Lab Work  - Routine labs UTD    Depression Screening  PHQ-9 Total Score: 8 (7/8/2025 11:21 AM)  Thoughts that you would be better off dead, or of hurting yourself in some way: 0 (7/8/2025 11:21 AM)

## 2025-07-08 NOTE — PROGRESS NOTES
2025    SUBJECTIVE  Chief Complaint   Patient presents with    New Patient     Referred by cardiology for maintenance refills       Nicolas Craft (:  1970) is a 55 y.o. male w/ PMHx of asthma, depression, HTN, Afib, CAD, cardiomyopathy, CHF presenting as a new patient to establish care. I discussed the case with patient's cardiologist prior to the appointment today. Dr. Lepe would like the patient evaluated for SALVATORE. Patient did endorse snoring and excessive daytime fatigue but always attributed to his heart. He has been hesitant on sleep studies because he does not want to sleep at the hospital.     Patient Active Problem List   Diagnosis    Asthma    Depression    Hx of blood clots    Chest pain    Class 3 severe obesity due to excess calories with serious comorbidity and body mass index (BMI) of 50.0 to 59.9 in adult (HCC)    Essential hypertension    Hypokalemia    Hyperglycemia    Cardiomyopathy (HCC)    Cannabis dependence, daily use (HCC)    Gastroesophageal reflux disease without esophagitis    Atrial fibrillation (HCC)    Decreased cardiac ejection fraction    Chronic systolic congestive heart failure (HCC)    Grade I diastolic dysfunction    Morbid obesity due to excess calories (HCC)    Coronary artery disease involving native coronary artery of native heart without angina pectoris    Iron deficiency anemia secondary to inadequate dietary iron intake    Encounter for routine adult health examination without abnormal findings    Colon cancer screening    Snoring       Review of Systems   Constitutional:  Negative for chills, fatigue and fever.   Respiratory:  Negative for cough and shortness of breath.    Cardiovascular:  Negative for chest pain.   Gastrointestinal:  Negative for abdominal pain, diarrhea, nausea and vomiting.   Musculoskeletal:  Negative for arthralgias.   Skin:  Negative for rash.   Neurological:  Negative for dizziness, syncope, weakness, light-headedness, numbness

## 2025-07-22 DIAGNOSIS — J45.909 MODERATE ASTHMA, UNSPECIFIED WHETHER COMPLICATED, UNSPECIFIED WHETHER PERSISTENT: ICD-10-CM

## 2025-07-22 RX ORDER — ALBUTEROL SULFATE 90 UG/1
1 INHALANT RESPIRATORY (INHALATION) EVERY 6 HOURS PRN
Qty: 18 G | Refills: 0 | Status: SHIPPED | OUTPATIENT
Start: 2025-07-22

## 2025-07-22 RX ORDER — METOPROLOL SUCCINATE 50 MG/1
50 TABLET, EXTENDED RELEASE ORAL DAILY
Qty: 90 TABLET | Refills: 0 | Status: SHIPPED | OUTPATIENT
Start: 2025-07-22

## 2025-07-22 RX ORDER — TORSEMIDE 20 MG/1
20 TABLET ORAL DAILY
Qty: 90 TABLET | Refills: 3 | Status: SHIPPED | OUTPATIENT
Start: 2025-07-22

## 2025-07-22 RX ORDER — AMLODIPINE BESYLATE 10 MG/1
10 TABLET ORAL DAILY
Qty: 90 TABLET | Refills: 0 | Status: SHIPPED | OUTPATIENT
Start: 2025-07-22

## 2025-07-22 RX ORDER — SACUBITRIL AND VALSARTAN 24; 26 MG/1; MG/1
1 TABLET, FILM COATED ORAL 2 TIMES DAILY
Qty: 180 TABLET | Refills: 3 | OUTPATIENT
Start: 2025-07-22

## 2025-07-22 NOTE — TELEPHONE ENCOUNTER
Medication Refill    Medication needing refilled:  apixaban (ELIQUIS) 5 MG TABS tablet [[id number]]     sacubitril-valsartan (ENTRESTO) 24-26 MG per tablet [[id number]]     torsemide (DEMADEX) 20 MG tablet [[id number]]     Dosage of the medication:    How are you taking this medication (QD, BID, TID, QID, PRN):    30 or 90 day supply called in:  30    When will you run out of your medication:  Almost Out of medication.     Which Pharmacy are we sending the medication to?:  [id number] - [place], [place] - [address] [facility] -  [phone] - F [phone]  [address] [place] [id number]  Phone: [phone]  Fax: [phone]         Last OV: 7/8/25  Next OV: 12/11/25

## 2025-07-22 NOTE — TELEPHONE ENCOUNTER
[facility] #[id number] - [place], [place] - [address] [facility] - P [phone] - F [phone]   [address] [facility] [address] [facility] [address]   Phone:  [phone]  Fax:  [phone]       amLODIPine (NORVASC) 10 MG tablet [[id number]]     metoprolol succinate (TOPROL XL) 50 MG extended release tablet [[id number]]     albuterol sulfate HFA (PROVENTIL;VENTOLIN;PROAIR) 108 (90 Base) MCG/ACT inhaler [[id number]]     mometasone-formoterol (DULERA) 200-5 MCG/ACT inhaler [[id number]]

## 2025-07-22 NOTE — TELEPHONE ENCOUNTER
Patient had 1 year supply of Eliquis and Entresto sent in March. He should not need refills at this time.     I sent a script for Torsemide.     Please call pt to advise. Thank you!

## 2025-07-22 NOTE — TELEPHONE ENCOUNTER
Received refill request for eliquis 5mg,valsartan 24-26mg and torsemide 20mg from [facility].     Last OV: 7/8/25    Next OV: 12/11/25    Last Labs:     Last Filled: 1/16/25

## 2025-08-01 ENCOUNTER — TELEPHONE (OUTPATIENT)
Dept: CARDIOLOGY CLINIC | Age: 55
End: 2025-08-01

## 2025-08-01 NOTE — TELEPHONE ENCOUNTER
Reviewed chart- script was sent to pharmacy for year supply back in March. Called and spoke with pharmacy has refills remaining- medication is filled off site but can have ready next day advised pharmacy to get ready for patient. Called and spoke with patient and advised. Did also advise patient that he has refills on file with the pharmacy and when he is due to contact the pharmacy.

## 2025-08-01 NOTE — TELEPHONE ENCOUNTER
Medication Refill    Medication needing refilled:  sacubitril-valsartan (ENTRESTO)   Dosage of the medication:  24-26 MG per tablet   How are you taking this medication (QD, BID, TID, QID, PRN):    30 or 90 day supply called in:  90  When will you run out of your medication:    Which Pharmacy are we sending the medication to?:  [facility] #[id number] - [place], [place] - [address] [place] [address] - P [phone] - F [phone]  [address] [place] [address]  Phone: [phone]  Fax: [phone]

## 2025-08-07 PROBLEM — Z00.00 ENCOUNTER FOR ROUTINE ADULT HEALTH EXAMINATION WITHOUT ABNORMAL FINDINGS: Status: RESOLVED | Noted: 2025-07-08 | Resolved: 2025-08-07

## 2025-08-07 PROBLEM — Z12.11 COLON CANCER SCREENING: Status: RESOLVED | Noted: 2025-07-08 | Resolved: 2025-08-07

## 2025-08-12 DIAGNOSIS — J45.909 MODERATE ASTHMA, UNSPECIFIED WHETHER COMPLICATED, UNSPECIFIED WHETHER PERSISTENT: ICD-10-CM

## 2025-08-21 ENCOUNTER — HOSPITAL ENCOUNTER (OUTPATIENT)
Age: 55
Discharge: HOME | End: 2025-08-21
Payer: MEDICAID

## 2025-08-21 ENCOUNTER — RESULTS FOLLOW-UP (OUTPATIENT)
Dept: CARDIOLOGY CLINIC | Age: 55
End: 2025-08-21

## 2025-08-21 DIAGNOSIS — I50.22 CHRONIC SYSTOLIC CONGESTIVE HEART FAILURE (HCC): ICD-10-CM

## 2025-08-21 LAB
ALBUMIN SERPL-MCNC: 4.2 G/DL (ref 3.4–5)
ALBUMIN/GLOB SERPL: 1.6 {RATIO} (ref 1.1–2.2)
ALP SERPL-CCNC: 60 U/L (ref 40–129)
ALT SERPL-CCNC: 17 U/L (ref 10–40)
ANION GAP SERPL CALCULATED.3IONS-SCNC: 9 MMOL/L (ref 3–16)
AST SERPL-CCNC: 22 U/L (ref 15–37)
BILIRUB SERPL-MCNC: 1.4 MG/DL (ref 0–1)
BUN SERPL-MCNC: 17 MG/DL (ref 7–20)
CALCIUM SERPL-MCNC: 9.7 MG/DL (ref 8.3–10.6)
CHLORIDE SERPL-SCNC: 107 MMOL/L (ref 99–110)
CO2 SERPL-SCNC: 26 MMOL/L (ref 21–32)
CREAT SERPL-MCNC: 1 MG/DL (ref 0.9–1.3)
GFR SERPLBLD CREATININE-BSD FMLA CKD-EPI: 90 ML/MIN/{1.73_M2}
GLUCOSE SERPL-MCNC: 69 MG/DL (ref 70–99)
NT-PROBNP SERPL-MCNC: 1600 PG/ML (ref 0–124)
POTASSIUM SERPL-SCNC: 3.6 MMOL/L (ref 3.5–5.1)
PROT SERPL-MCNC: 6.9 G/DL (ref 6.4–8.2)
SODIUM SERPL-SCNC: 142 MMOL/L (ref 136–145)

## 2025-08-21 PROCEDURE — 80053 COMPREHEN METABOLIC PANEL: CPT

## 2025-08-21 PROCEDURE — 36415 COLL VENOUS BLD VENIPUNCTURE: CPT

## 2025-08-21 PROCEDURE — 83880 ASSAY OF NATRIURETIC PEPTIDE: CPT
